# Patient Record
Sex: FEMALE | Race: WHITE | Employment: FULL TIME
[De-identification: names, ages, dates, MRNs, and addresses within clinical notes are randomized per-mention and may not be internally consistent; named-entity substitution may affect disease eponyms.]

---

## 2017-01-06 ENCOUNTER — OFFICE VISIT (OUTPATIENT)
Dept: FAMILY MEDICINE CLINIC | Facility: CLINIC | Age: 42
End: 2017-01-06

## 2017-01-06 VITALS
DIASTOLIC BLOOD PRESSURE: 100 MMHG | WEIGHT: 220 LBS | HEART RATE: 72 BPM | SYSTOLIC BLOOD PRESSURE: 130 MMHG | BODY MASS INDEX: 33.95 KG/M2 | TEMPERATURE: 98 F | RESPIRATION RATE: 20 BRPM

## 2017-01-06 DIAGNOSIS — F41.1 GAD (GENERALIZED ANXIETY DISORDER): Primary | ICD-10-CM

## 2017-01-06 DIAGNOSIS — F41.0 PANIC ATTACK: ICD-10-CM

## 2017-01-06 PROCEDURE — 99214 OFFICE O/P EST MOD 30 MIN: CPT | Performed by: NURSE PRACTITIONER

## 2017-01-06 RX ORDER — ALPRAZOLAM 0.25 MG/1
0.25 TABLET ORAL DAILY PRN
Qty: 10 TABLET | Refills: 0 | Status: SHIPPED | OUTPATIENT
Start: 2017-01-06 | End: 2017-02-05

## 2017-01-06 RX ORDER — CITALOPRAM 10 MG/1
10 TABLET ORAL DAILY
Qty: 30 TABLET | Refills: 3 | Status: SHIPPED | OUTPATIENT
Start: 2017-01-06 | End: 2017-05-18 | Stop reason: SDUPTHER

## 2017-01-06 ASSESSMENT — ENCOUNTER SYMPTOMS
COUGH: 0
SORE THROAT: 0
SINUS PRESSURE: 0
RHINORRHEA: 0
EYE REDNESS: 1
ABDOMINAL PAIN: 0
EYE ITCHING: 1
CHEST TIGHTNESS: 0
SHORTNESS OF BREATH: 0
CONSTIPATION: 1
WHEEZING: 0
NAUSEA: 0
EYE DISCHARGE: 1
DIARRHEA: 0

## 2017-05-18 DIAGNOSIS — F41.1 GAD (GENERALIZED ANXIETY DISORDER): ICD-10-CM

## 2017-05-18 DIAGNOSIS — F41.0 PANIC ATTACK: ICD-10-CM

## 2017-05-18 RX ORDER — CITALOPRAM 10 MG/1
10 TABLET ORAL DAILY
Qty: 30 TABLET | Refills: 0 | Status: SHIPPED | OUTPATIENT
Start: 2017-05-18 | End: 2017-06-29 | Stop reason: SDUPTHER

## 2017-06-29 ENCOUNTER — TELEPHONE (OUTPATIENT)
Dept: FAMILY MEDICINE CLINIC | Age: 42
End: 2017-06-29

## 2017-06-29 DIAGNOSIS — F41.0 PANIC ATTACK: ICD-10-CM

## 2017-06-29 DIAGNOSIS — F41.1 GAD (GENERALIZED ANXIETY DISORDER): ICD-10-CM

## 2017-06-29 RX ORDER — CITALOPRAM 10 MG/1
10 TABLET ORAL DAILY
Qty: 30 TABLET | Refills: 0 | Status: SHIPPED | OUTPATIENT
Start: 2017-06-29 | End: 2017-08-16 | Stop reason: SDUPTHER

## 2017-08-16 DIAGNOSIS — F41.1 GAD (GENERALIZED ANXIETY DISORDER): ICD-10-CM

## 2017-08-16 DIAGNOSIS — F41.0 PANIC ATTACK: ICD-10-CM

## 2017-08-17 ENCOUNTER — PATIENT MESSAGE (OUTPATIENT)
Dept: FAMILY MEDICINE CLINIC | Age: 42
End: 2017-08-17

## 2017-08-17 RX ORDER — CITALOPRAM 10 MG/1
10 TABLET ORAL DAILY
Qty: 30 TABLET | Refills: 1 | Status: SHIPPED | OUTPATIENT
Start: 2017-08-17 | End: 2017-11-13 | Stop reason: SDUPTHER

## 2017-12-21 DIAGNOSIS — F41.1 GAD (GENERALIZED ANXIETY DISORDER): ICD-10-CM

## 2017-12-21 DIAGNOSIS — F41.0 PANIC ATTACK: ICD-10-CM

## 2017-12-22 RX ORDER — CITALOPRAM 10 MG/1
10 TABLET ORAL DAILY
Qty: 30 TABLET | Refills: 0 | Status: SHIPPED | OUTPATIENT
Start: 2017-12-22 | End: 2018-02-14 | Stop reason: SDUPTHER

## 2017-12-22 NOTE — TELEPHONE ENCOUNTER
LOV: 01/06/17  RTO: 6 weeks  LR: 11/14/17    Health Maintenance   Topic Date Due    HIV screen  04/14/1992    DTaP/Tdap/Td vaccine (1 - Tdap) 04/14/1996    Flu vaccine (1) 09/01/2017    Cervical cancer screen  10/30/2017    Pneumococcal med risk (1 of 1 - PPSV23) 01/08/2018 (Originally 4/14/1996)    Lipid screen  02/21/2019             (applicable per patient's age: Cancer Screenings, Depression Screening, Fall Risk Screening, Immunizations)    LDL Cholesterol (mg/dL)   Date Value   02/21/2014 91     AST (U/L)   Date Value   02/21/2014 21     ALT (U/L)   Date Value   02/21/2014 10     BUN (mg/dL)   Date Value   02/21/2014 9      (goal A1C is < 7)   (goal LDL is <100) need 30-50% reduction from baseline     BP Readings from Last 3 Encounters:   01/06/17 (!) 130/100   11/18/16 102/70   08/11/14 110/62    (goal /80)      All Future Testing planned in [facility]:  Lab Frequency Next Occurrence   Lipid Panel Once 06/30/2017   Comprehensive Metabolic Panel Once 72/25/7731   CBC Once 06/30/2017       Next Visit Date:  Future Appointments  Date Time Provider Maryanne Addison   12/26/2017 4:40 PM [facility], PATT Chan            Patient Active Problem List:     Neurologic cardiac syncope     Anxiety     Tobacco abuse

## 2018-01-11 ENCOUNTER — TELEPHONE (OUTPATIENT)
Dept: FAMILY MEDICINE CLINIC | Age: 43
End: 2018-01-11

## 2018-01-12 ENCOUNTER — TELEPHONE (OUTPATIENT)
Dept: FAMILY MEDICINE CLINIC | Age: 43
End: 2018-01-12

## 2018-01-12 DIAGNOSIS — J01.90 ACUTE BACTERIAL SINUSITIS: Primary | ICD-10-CM

## 2018-01-12 DIAGNOSIS — B96.89 ACUTE BACTERIAL SINUSITIS: Primary | ICD-10-CM

## 2018-01-12 RX ORDER — DOXYCYCLINE HYCLATE 100 MG
100 TABLET ORAL 2 TIMES DAILY
Qty: 20 TABLET | Refills: 0 | Status: SHIPPED | OUTPATIENT
Start: 2018-01-12 | End: 2018-01-22

## 2018-02-14 ENCOUNTER — OFFICE VISIT (OUTPATIENT)
Dept: FAMILY MEDICINE CLINIC | Age: 43
End: 2018-02-14
Payer: COMMERCIAL

## 2018-02-14 VITALS
SYSTOLIC BLOOD PRESSURE: 100 MMHG | HEART RATE: 64 BPM | HEIGHT: 68 IN | WEIGHT: 235 LBS | DIASTOLIC BLOOD PRESSURE: 76 MMHG | TEMPERATURE: 98.5 F | BODY MASS INDEX: 35.61 KG/M2 | RESPIRATION RATE: 16 BRPM

## 2018-02-14 DIAGNOSIS — K59.09 CHRONIC CONSTIPATION: ICD-10-CM

## 2018-02-14 DIAGNOSIS — Z23 NEED FOR PROPHYLACTIC VACCINATION AGAINST STREPTOCOCCUS PNEUMONIAE (PNEUMOCOCCUS): ICD-10-CM

## 2018-02-14 DIAGNOSIS — F41.0 PANIC ATTACK: ICD-10-CM

## 2018-02-14 DIAGNOSIS — R53.82 CHRONIC FATIGUE: ICD-10-CM

## 2018-02-14 DIAGNOSIS — Z72.0 TOBACCO ABUSE: ICD-10-CM

## 2018-02-14 DIAGNOSIS — F41.1 GAD (GENERALIZED ANXIETY DISORDER): Primary | ICD-10-CM

## 2018-02-14 DIAGNOSIS — L65.9 HAIR LOSS: ICD-10-CM

## 2018-02-14 DIAGNOSIS — R63.8 UNABLE TO LOSE WEIGHT: ICD-10-CM

## 2018-02-14 DIAGNOSIS — Z11.4 ENCOUNTER FOR SCREENING FOR HIV: ICD-10-CM

## 2018-02-14 DIAGNOSIS — Z01.89 ROUTINE LAB DRAW: ICD-10-CM

## 2018-02-14 PROCEDURE — G8427 DOCREV CUR MEDS BY ELIG CLIN: HCPCS | Performed by: NURSE PRACTITIONER

## 2018-02-14 PROCEDURE — G8417 CALC BMI ABV UP PARAM F/U: HCPCS | Performed by: NURSE PRACTITIONER

## 2018-02-14 PROCEDURE — 90471 IMMUNIZATION ADMIN: CPT | Performed by: NURSE PRACTITIONER

## 2018-02-14 PROCEDURE — G8484 FLU IMMUNIZE NO ADMIN: HCPCS | Performed by: NURSE PRACTITIONER

## 2018-02-14 PROCEDURE — 4004F PT TOBACCO SCREEN RCVD TLK: CPT | Performed by: NURSE PRACTITIONER

## 2018-02-14 PROCEDURE — 99214 OFFICE O/P EST MOD 30 MIN: CPT | Performed by: NURSE PRACTITIONER

## 2018-02-14 PROCEDURE — 90732 PPSV23 VACC 2 YRS+ SUBQ/IM: CPT | Performed by: NURSE PRACTITIONER

## 2018-02-14 RX ORDER — ANTACID TABLETS 648 MG/1
1 TABLET, CHEWABLE ORAL DAILY
Qty: 30 TABLET | Refills: 0 | COMMUNITY
Start: 2018-02-14 | End: 2019-02-14

## 2018-02-14 RX ORDER — CITALOPRAM 10 MG/1
10 TABLET ORAL DAILY
Qty: 30 TABLET | Refills: 5 | Status: SHIPPED | OUTPATIENT
Start: 2018-02-14 | End: 2018-11-08 | Stop reason: SDUPTHER

## 2018-02-14 RX ORDER — CALCIUM CARBONATE/VITAMIN D3 500-10/5ML
2 LIQUID (ML) ORAL DAILY
Qty: 60 CAPSULE | Refills: 0 | COMMUNITY
Start: 2018-02-14 | End: 2019-02-14

## 2018-02-14 ASSESSMENT — ENCOUNTER SYMPTOMS
SHORTNESS OF BREATH: 0
DIARRHEA: 0
SORE THROAT: 0
EYE DISCHARGE: 1
RHINORRHEA: 0
SINUS PRESSURE: 0
COUGH: 0
ABDOMINAL PAIN: 0
EYE ITCHING: 0
CHEST TIGHTNESS: 0
CONSTIPATION: 1
EYE REDNESS: 0
WHEEZING: 0
NAUSEA: 0

## 2018-02-14 ASSESSMENT — PATIENT HEALTH QUESTIONNAIRE - PHQ9
SUM OF ALL RESPONSES TO PHQ QUESTIONS 1-9: 0
2. FEELING DOWN, DEPRESSED OR HOPELESS: 0
1. LITTLE INTEREST OR PLEASURE IN DOING THINGS: 0
SUM OF ALL RESPONSES TO PHQ9 QUESTIONS 1 & 2: 0

## 2018-02-14 NOTE — PATIENT INSTRUCTIONS
play a relaxation tape while you drive a car. When should you call for help? Call 911 anytime you think you may need emergency care. For example, call if:  ? · You feel you cannot stop from hurting yourself or someone else. ? Keep the numbers for these national suicide hotlines: 3-780-313-TALK (2-650.711.4320) and 1-879-ARWJNWL (2-348.551.9477). If you or someone you know talks about suicide or feeling hopeless, get help right away. ? Watch closely for changes in your health, and be sure to contact your doctor if:  ? · You have anxiety or fear that affects your life. ? · You have symptoms of anxiety that are new or different from those you had before. Where can you learn more? Go to https://Bin1 ATEpeSkyPhraseeb.[employer] Online [employer]. org and sign in to your [facility] account. Enter P754 in the [facility] box to learn more about \"Anxiety Disorder: Care Instructions. \"     If you do not have an account, please click on the \"Sign Up Now\" link. Current as of: May 12, 2017  Content Version: 11.5  © 8769-8560 [employer]. Care instructions adapted under license by [facility] ([facility]). If you have questions about a medical condition or this instruction, always ask your healthcare professional. [address] any warranty or liability for your use of this information. Patient Education        Fatigue: Care Instructions  Your Care Instructions    Fatigue is a feeling of tiredness, exhaustion, or lack of energy. You may feel fatigue because of too much or not enough activity. It can also come from stress, lack of sleep, boredom, and poor diet. Many medical problems, such as viral infections, can cause fatigue. Emotional problems, especially depression, are often the cause of fatigue. Fatigue is most often a symptom of another problem. Treatment for fatigue depends on the cause.  For example, if you have fatigue because you have a certain health problem, treating this problem also treats small children, living smoke-free means they're less likely to have ear infections, pneumonia, and bronchitis. · If you're a woman who is or will be pregnant someday, quitting smoking means a healthier . · Children who are close to you are less likely to become adult smokers. Where can you learn more? Go to https://chpeluisito.healthJamStar. org and sign in to your [employer] account. Enter [id number] [id number] in the [employer] box to learn more about \"Learning About Benefits From Quitting Smoking. \"     If you do not have an account, please click on the \"Sign Up Now\" link. Current as of: 2017  Content Version: 11.5  © 0828-5175 Healthwise, Incorporated. Care instructions adapted under license by [facility] ([facility]). If you have questions about a medical condition or this instruction, always ask your healthcare professional. [address] any warranty or liability for your use of this information.

## 2018-02-14 NOTE — PROGRESS NOTES
chest tightness, shortness of breath and wheezing. Cardiovascular: Negative for chest pain and palpitations. Gastrointestinal: Positive for constipation (chronic). Negative for abdominal pain, diarrhea and nausea. Genitourinary: Negative. No menses d/t mirena   Skin: Negative for rash. Allergic/Immunologic: Positive for environmental allergies. Neurological: Negative for dizziness, syncope and headaches. Psychiatric/Behavioral: Negative for self-injury, sleep disturbance and suicidal ideas. The patient is nervous/anxious. Mood controlled on current medications       Objective:   Physical Exam   Constitutional: She is oriented to person, place, and time. Vital signs are normal. She appears well-developed. She is cooperative. No distress. Obese   HENT:   Head: Normocephalic and atraumatic. Right Ear: Tympanic membrane, external ear and ear canal normal. Right ear middle ear effusion: mild. Left Ear: Tympanic membrane, external ear and ear canal normal. Left ear middle ear effusion: mild. Nose: Mucosal edema and rhinorrhea present. Right sinus exhibits no maxillary sinus tenderness and no frontal sinus tenderness. Left sinus exhibits no maxillary sinus tenderness and no frontal sinus tenderness. Mouth/Throat: Uvula is midline and mucous membranes are normal. Posterior oropharyngeal erythema (cobblestoning) present. Eyes: Conjunctivae and lids are normal. Pupils are equal, round, and reactive to light. Right eye exhibits no discharge. Left eye exhibits no discharge. Neck: Trachea normal and normal range of motion. Neck supple. Carotid bruit is not present. Cardiovascular: Normal rate, regular rhythm and normal heart sounds. Exam reveals no distant heart sounds. No murmur heard. Pulses:       Radial pulses are 2+ on the right side, and 2+ on the left side. Pulmonary/Chest: Effort normal and breath sounds normal. No respiratory distress.  She has no decreased breath sounds. She has no wheezes. She has no rhonchi. She exhibits no tenderness. Abdominal: Soft. Normal appearance and bowel sounds are normal. There is no tenderness. There is no rebound. Musculoskeletal:   No red or swollen joints. Lymphadenopathy:     She has no cervical adenopathy. Neurological: She is alert and oriented to person, place, and time. She has normal strength. She exhibits normal muscle tone. Skin: Skin is warm and dry. No rash noted. Psychiatric: Her speech is normal and behavior is normal. Judgment and thought content normal. Her mood appears anxious. Cognition and memory are normal. She expresses no homicidal and no suicidal ideation. Nursing note and vitals reviewed. Assessment:      1. TWILA (generalized anxiety disorder)  citalopram (CELEXA) 10 MG tablet   2. Panic attack  citalopram (CELEXA) 10 MG tablet   3. Hair loss  TSH without Reflex    T3, Free    T4, Free   4. Chronic fatigue  CBC    TSH without Reflex    Vitamin B12    T3, Free    T4, Free   5. Chronic constipation  Magnesium Oxide 400 MG CAPS   6. Tobacco abuse     7. Unable to lose weight  Hemoglobin A1C    TSH without Reflex    Insulin, total   8. Routine lab draw  Lipid Panel    Comprehensive Metabolic Panel    CBC   9. Need for prophylactic vaccination against Streptococcus pneumoniae (pneumococcus)  Pneumococcal polysaccharide vaccine 23-valent PPSV23   10. Encounter for screening for HIV  HIV Screen         Plan:       Obtain Pap from Dr Saul Overcast labs as ordered  Proceed with pneumovax as ordered   Smoking cessation encouraged - she is working on this  Recommend continue current medications, healthy diet, and regular exercise. Monitor for worsening symptoms   Call with concerns  Return in about 6 months (around 8/14/2018) for anxiety.       Keo Morelos received counseling on the following healthy behaviors: nutrition, exercise, medication adherence and tobacco cessation  Reviewed prior labs and health

## 2018-02-16 ENCOUNTER — HOSPITAL ENCOUNTER (OUTPATIENT)
Age: 43
Setting detail: SPECIMEN
Discharge: HOME | End: 2018-02-16
Payer: COMMERCIAL

## 2018-02-16 DIAGNOSIS — Z01.89 ROUTINE LAB DRAW: ICD-10-CM

## 2018-02-16 DIAGNOSIS — R53.82 CHRONIC FATIGUE: ICD-10-CM

## 2018-02-16 DIAGNOSIS — R63.8 UNABLE TO LOSE WEIGHT: ICD-10-CM

## 2018-02-16 DIAGNOSIS — L65.9 HAIR LOSS: ICD-10-CM

## 2018-02-16 DIAGNOSIS — Z11.4 ENCOUNTER FOR SCREENING FOR HIV: ICD-10-CM

## 2018-02-16 LAB
ALBUMIN SERPL-MCNC: 4.2 G/DL (ref 3.5–5.2)
ALBUMIN/GLOBULIN RATIO: 1.3 (ref 1–2.5)
ALP BLD-CCNC: 39 U/L (ref 35–104)
ALT SERPL-CCNC: 10 U/L (ref 5–33)
ANION GAP SERPL CALCULATED.3IONS-SCNC: 14 MMOL/L (ref 9–17)
AST SERPL-CCNC: 17 U/L
BILIRUB SERPL-MCNC: 0.66 MG/DL (ref 0.3–1.2)
BUN BLDV-MCNC: 13 MG/DL (ref 6–20)
BUN/CREAT BLD: NORMAL (ref 9–20)
CALCIUM SERPL-MCNC: 9 MG/DL (ref 8.6–10.4)
CHLORIDE BLD-SCNC: 101 MMOL/L (ref 98–107)
CHOLESTEROL/HDL RATIO: 2.9
CHOLESTEROL: 196 MG/DL
CO2: 24 MMOL/L (ref 20–31)
CREAT SERPL-MCNC: 0.59 MG/DL (ref 0.5–0.9)
ESTIMATED AVERAGE GLUCOSE: 94 MG/DL
GFR AFRICAN AMERICAN: >60 ML/MIN
GFR NON-AFRICAN AMERICAN: >60 ML/MIN
GFR SERPL CREATININE-BSD FRML MDRD: NORMAL ML/MIN/{1.73_M2}
GFR SERPL CREATININE-BSD FRML MDRD: NORMAL ML/MIN/{1.73_M2}
GLUCOSE BLD-MCNC: 80 MG/DL (ref 70–99)
HBA1C MFR BLD: 4.9 % (ref 4–6)
HCT VFR BLD CALC: 42.1 % (ref 36.3–47.1)
HDLC SERPL-MCNC: 67 MG/DL
HEMOGLOBIN: 13.2 G/DL (ref 11.9–15.1)
HIV AG/AB: NONREACTIVE
INSULIN COMMENT: NORMAL
INSULIN REFERENCE RANGE:: NORMAL
INSULIN: 5.1 MU/L
LDL CHOLESTEROL: 120 MG/DL (ref 0–130)
MCH RBC QN AUTO: 30.2 PG (ref 25.2–33.5)
MCHC RBC AUTO-ENTMCNC: 31.4 G/DL (ref 28.4–34.8)
MCV RBC AUTO: 96.3 FL (ref 82.6–102.9)
NRBC AUTOMATED: 0 PER 100 WBC
PDW BLD-RTO: 12.2 % (ref 11.8–14.4)
PLATELET # BLD: 318 K/UL (ref 138–453)
PMV BLD AUTO: 10.7 FL (ref 8.1–13.5)
POTASSIUM SERPL-SCNC: 4.1 MMOL/L (ref 3.7–5.3)
RBC # BLD: 4.37 M/UL (ref 3.95–5.11)
SODIUM BLD-SCNC: 139 MMOL/L (ref 135–144)
T3 FREE: 2.45 PG/ML (ref 2.02–4.43)
THYROXINE, FREE: 1.05 NG/DL (ref 0.93–1.7)
TOTAL PROTEIN: 7.5 G/DL (ref 6.4–8.3)
TRIGL SERPL-MCNC: 47 MG/DL
TSH SERPL DL<=0.05 MIU/L-ACNC: 1.16 MIU/L (ref 0.3–5)
VITAMIN B-12: 999 PG/ML (ref 232–1245)
VLDLC SERPL CALC-MCNC: NORMAL MG/DL (ref 1–30)
WBC # BLD: 6.7 K/UL (ref 3.5–11.3)

## 2018-03-12 PROBLEM — Z41.1 ELECTIVE PROCEDURE FOR UNACCEPTABLE COSMETIC APPEARANCE: Status: ACTIVE | Noted: 2018-03-12

## 2018-03-23 ENCOUNTER — HOSPITAL ENCOUNTER (OUTPATIENT)
Age: 43
Setting detail: SPECIMEN
Discharge: HOME | End: 2018-03-23
Payer: COMMERCIAL

## 2018-03-23 LAB
-: NORMAL
AMORPHOUS: NORMAL
BACTERIA: NORMAL
BILIRUBIN URINE: NEGATIVE
CASTS UA: NORMAL /LPF (ref 0–8)
COLOR: YELLOW
COMMENT UA: ABNORMAL
CRYSTALS, UA: NORMAL /HPF
EPITHELIAL CELLS UA: NORMAL /HPF (ref 0–5)
GLUCOSE URINE: NEGATIVE
KETONES, URINE: NEGATIVE
LEUKOCYTE ESTERASE, URINE: NEGATIVE
MUCUS: NORMAL
NITRITE, URINE: NEGATIVE
OTHER OBSERVATIONS UA: NORMAL
PH UA: 6.5 (ref 5–8)
PROTEIN UA: NEGATIVE
RBC UA: NORMAL /HPF (ref 0–4)
RENAL EPITHELIAL, UA: NORMAL /HPF
SPECIFIC GRAVITY UA: 1.01 (ref 1–1.03)
TRICHOMONAS: NORMAL
TURBIDITY: ABNORMAL
URINE HGB: NEGATIVE
UROBILINOGEN, URINE: NORMAL
WBC UA: NORMAL /HPF (ref 0–5)
YEAST: NORMAL

## 2018-03-29 ENCOUNTER — HOSPITAL ENCOUNTER (OUTPATIENT)
Facility: CLINIC | Age: 43
Setting detail: OUTPATIENT SURGERY
Discharge: HOME | End: 2018-03-29
Attending: PLASTIC SURGERY | Admitting: PLASTIC SURGERY

## 2018-03-29 ENCOUNTER — ANESTHESIA (OUTPATIENT)
Dept: OPERATING ROOM | Facility: CLINIC | Age: 43
End: 2018-03-29

## 2018-03-29 ENCOUNTER — ANESTHESIA EVENT (OUTPATIENT)
Dept: OPERATING ROOM | Facility: CLINIC | Age: 43
End: 2018-03-29

## 2018-03-29 VITALS
HEART RATE: 60 BPM | RESPIRATION RATE: 10 BRPM | WEIGHT: 225 LBS | DIASTOLIC BLOOD PRESSURE: 67 MMHG | BODY MASS INDEX: 34.1 KG/M2 | HEIGHT: 68 IN | OXYGEN SATURATION: 97 % | TEMPERATURE: 96.8 F | SYSTOLIC BLOOD PRESSURE: 118 MMHG

## 2018-03-29 VITALS
TEMPERATURE: 94.8 F | OXYGEN SATURATION: 100 % | DIASTOLIC BLOOD PRESSURE: 70 MMHG | SYSTOLIC BLOOD PRESSURE: 128 MMHG | RESPIRATION RATE: 11 BRPM

## 2018-03-29 DIAGNOSIS — Z41.1 ELECTIVE PROCEDURE FOR UNACCEPTABLE COSMETIC APPEARANCE: Primary | ICD-10-CM

## 2018-03-29 PROCEDURE — 7100000000 HC PACU RECOVERY - FIRST 15 MIN: Performed by: PLASTIC SURGERY

## 2018-03-29 PROCEDURE — 6360000002 HC RX W HCPCS: Performed by: PLASTIC SURGERY

## 2018-03-29 PROCEDURE — 3600000004 HC SURGERY LEVEL 4 BASE: Performed by: PLASTIC SURGERY

## 2018-03-29 PROCEDURE — 6360000002 HC RX W HCPCS: Performed by: ANESTHESIOLOGY

## 2018-03-29 PROCEDURE — 2500000003 HC RX 250 WO HCPCS: Performed by: PLASTIC SURGERY

## 2018-03-29 PROCEDURE — 7100000010 HC PHASE II RECOVERY - FIRST 15 MIN: Performed by: PLASTIC SURGERY

## 2018-03-29 PROCEDURE — 6370000000 HC RX 637 (ALT 250 FOR IP): Performed by: PLASTIC SURGERY

## 2018-03-29 PROCEDURE — 2580000003 HC RX 258: Performed by: ANESTHESIOLOGY

## 2018-03-29 PROCEDURE — 3700000001 HC ADD 15 MINUTES (ANESTHESIA): Performed by: PLASTIC SURGERY

## 2018-03-29 PROCEDURE — 7100000001 HC PACU RECOVERY - ADDTL 15 MIN: Performed by: PLASTIC SURGERY

## 2018-03-29 PROCEDURE — 2580000003 HC RX 258: Performed by: PLASTIC SURGERY

## 2018-03-29 PROCEDURE — 84703 CHORIONIC GONADOTROPIN ASSAY: CPT

## 2018-03-29 PROCEDURE — 7100000011 HC PHASE II RECOVERY - ADDTL 15 MIN: Performed by: PLASTIC SURGERY

## 2018-03-29 PROCEDURE — 2500000003 HC RX 250 WO HCPCS: Performed by: ANESTHESIOLOGY

## 2018-03-29 PROCEDURE — 3600000014 HC SURGERY LEVEL 4 ADDTL 15MIN: Performed by: PLASTIC SURGERY

## 2018-03-29 PROCEDURE — 3700000000 HC ANESTHESIA ATTENDED CARE: Performed by: PLASTIC SURGERY

## 2018-03-29 DEVICE — IMPLANTABLE DEVICE: Type: IMPLANTABLE DEVICE | Site: BREAST | Status: FUNCTIONAL

## 2018-03-29 RX ORDER — BUPIVACAINE HYDROCHLORIDE 5 MG/ML
INJECTION, SOLUTION EPIDURAL; INTRACAUDAL PRN
Status: DISCONTINUED | OUTPATIENT
Start: 2018-03-29 | End: 2018-03-29 | Stop reason: HOSPADM

## 2018-03-29 RX ORDER — OXYCODONE HYDROCHLORIDE AND ACETAMINOPHEN 5; 325 MG/1; MG/1
1 TABLET ORAL ONCE
Status: COMPLETED | OUTPATIENT
Start: 2018-03-29 | End: 2018-03-29

## 2018-03-29 RX ORDER — CEFAZOLIN SODIUM 1 G/3ML
2 INJECTION, POWDER, FOR SOLUTION INTRAMUSCULAR; INTRAVENOUS ONCE
Status: COMPLETED | OUTPATIENT
Start: 2018-03-29 | End: 2018-03-29

## 2018-03-29 RX ORDER — PROPOFOL 10 MG/ML
INJECTION, EMULSION INTRAVENOUS PRN
Status: DISCONTINUED | OUTPATIENT
Start: 2018-03-29 | End: 2018-03-29 | Stop reason: SDUPTHER

## 2018-03-29 RX ORDER — FENTANYL CITRATE 50 UG/ML
50 INJECTION, SOLUTION INTRAMUSCULAR; INTRAVENOUS EVERY 5 MIN PRN
Status: DISCONTINUED | OUTPATIENT
Start: 2018-03-29 | End: 2018-03-29 | Stop reason: HOSPADM

## 2018-03-29 RX ORDER — SODIUM CHLORIDE, SODIUM LACTATE, POTASSIUM CHLORIDE, CALCIUM CHLORIDE 600; 310; 30; 20 MG/100ML; MG/100ML; MG/100ML; MG/100ML
INJECTION, SOLUTION INTRAVENOUS CONTINUOUS
Status: DISCONTINUED | OUTPATIENT
Start: 2018-03-29 | End: 2018-03-29 | Stop reason: HOSPADM

## 2018-03-29 RX ORDER — FENTANYL CITRATE 50 UG/ML
INJECTION, SOLUTION INTRAMUSCULAR; INTRAVENOUS PRN
Status: DISCONTINUED | OUTPATIENT
Start: 2018-03-29 | End: 2018-03-29 | Stop reason: SDUPTHER

## 2018-03-29 RX ORDER — MEPERIDINE HYDROCHLORIDE 50 MG/ML
12.5 INJECTION INTRAMUSCULAR; INTRAVENOUS; SUBCUTANEOUS EVERY 5 MIN PRN
Status: DISCONTINUED | OUTPATIENT
Start: 2018-03-29 | End: 2018-03-29 | Stop reason: HOSPADM

## 2018-03-29 RX ORDER — FENTANYL CITRATE 50 UG/ML
25 INJECTION, SOLUTION INTRAMUSCULAR; INTRAVENOUS EVERY 5 MIN PRN
Status: DISCONTINUED | OUTPATIENT
Start: 2018-03-29 | End: 2018-03-29 | Stop reason: HOSPADM

## 2018-03-29 RX ORDER — CEFAZOLIN SODIUM 1 G/3ML
1 INJECTION, POWDER, FOR SOLUTION INTRAMUSCULAR; INTRAVENOUS ONCE
Status: COMPLETED | OUTPATIENT
Start: 2018-03-29 | End: 2018-03-29

## 2018-03-29 RX ORDER — ROCURONIUM BROMIDE 10 MG/ML
INJECTION, SOLUTION INTRAVENOUS PRN
Status: DISCONTINUED | OUTPATIENT
Start: 2018-03-29 | End: 2018-03-29 | Stop reason: SDUPTHER

## 2018-03-29 RX ORDER — MIDAZOLAM HYDROCHLORIDE 1 MG/ML
2 INJECTION INTRAMUSCULAR; INTRAVENOUS ONCE
Status: COMPLETED | OUTPATIENT
Start: 2018-03-29 | End: 2018-03-29

## 2018-03-29 RX ORDER — CLINDAMYCIN HYDROCHLORIDE 300 MG/1
300 CAPSULE ORAL 3 TIMES DAILY
Qty: 30 CAPSULE | Refills: 0 | Status: SHIPPED | OUTPATIENT
Start: 2018-03-29 | End: 2018-04-08

## 2018-03-29 RX ORDER — LIDOCAINE HYDROCHLORIDE 10 MG/ML
INJECTION, SOLUTION INFILTRATION; PERINEURAL PRN
Status: DISCONTINUED | OUTPATIENT
Start: 2018-03-29 | End: 2018-03-29 | Stop reason: SDUPTHER

## 2018-03-29 RX ORDER — ONDANSETRON 2 MG/ML
4 INJECTION INTRAMUSCULAR; INTRAVENOUS
Status: COMPLETED | OUTPATIENT
Start: 2018-03-29 | End: 2018-03-29

## 2018-03-29 RX ORDER — GLYCOPYRROLATE 0.2 MG/ML
INJECTION INTRAMUSCULAR; INTRAVENOUS PRN
Status: DISCONTINUED | OUTPATIENT
Start: 2018-03-29 | End: 2018-03-29 | Stop reason: SDUPTHER

## 2018-03-29 RX ORDER — NEOSTIGMINE METHYLSULFATE 1 MG/ML
INJECTION, SOLUTION INTRAVENOUS PRN
Status: DISCONTINUED | OUTPATIENT
Start: 2018-03-29 | End: 2018-03-29 | Stop reason: SDUPTHER

## 2018-03-29 RX ORDER — SCOLOPAMINE TRANSDERMAL SYSTEM 1 MG/1
1 PATCH, EXTENDED RELEASE TRANSDERMAL
Status: DISCONTINUED | OUTPATIENT
Start: 2018-03-29 | End: 2018-03-29 | Stop reason: HOSPADM

## 2018-03-29 RX ORDER — OXYCODONE HYDROCHLORIDE AND ACETAMINOPHEN 5; 325 MG/1; MG/1
1 TABLET ORAL EVERY 4 HOURS PRN
Qty: 40 TABLET | Refills: 0 | Status: SHIPPED | OUTPATIENT
Start: 2018-03-29 | End: 2018-04-08

## 2018-03-29 RX ADMIN — FENTANYL CITRATE 50 MCG: 50 INJECTION INTRAMUSCULAR; INTRAVENOUS at 09:23

## 2018-03-29 RX ADMIN — MIDAZOLAM HYDROCHLORIDE 2 MG: 1 INJECTION, SOLUTION INTRAMUSCULAR; INTRAVENOUS at 08:59

## 2018-03-29 RX ADMIN — LIDOCAINE HYDROCHLORIDE 50 MG: 10 INJECTION, SOLUTION EPIDURAL; INFILTRATION; INTRACAUDAL; PERINEURAL at 09:08

## 2018-03-29 RX ADMIN — ONDANSETRON 4 MG: 2 INJECTION INTRAMUSCULAR; INTRAVENOUS at 10:26

## 2018-03-29 RX ADMIN — CEFAZOLIN 2 G: 1 INJECTION, POWDER, FOR SOLUTION INTRAMUSCULAR; INTRAVENOUS at 09:19

## 2018-03-29 RX ADMIN — FENTANYL CITRATE 50 MCG: 50 INJECTION INTRAMUSCULAR; INTRAVENOUS at 09:09

## 2018-03-29 RX ADMIN — ROCURONIUM BROMIDE 50 MG: 10 INJECTION INTRAVENOUS at 09:09

## 2018-03-29 RX ADMIN — CEFAZOLIN 1 G: 330 INJECTION, POWDER, FOR SOLUTION INTRAMUSCULAR; INTRAVENOUS at 10:55

## 2018-03-29 RX ADMIN — SODIUM CHLORIDE, POTASSIUM CHLORIDE, SODIUM LACTATE AND CALCIUM CHLORIDE: 600; 310; 30; 20 INJECTION, SOLUTION INTRAVENOUS at 08:59

## 2018-03-29 RX ADMIN — PROPOFOL 200 MG: 10 INJECTION, EMULSION INTRAVENOUS at 09:08

## 2018-03-29 RX ADMIN — NEOSTIGMINE METHYLSULFATE 2 MG: 1 INJECTION, SOLUTION INTRAVENOUS at 10:32

## 2018-03-29 RX ADMIN — GLYCOPYRROLATE 0.4 MG: 0.2 INJECTION INTRAMUSCULAR; INTRAVENOUS at 10:32

## 2018-03-29 RX ADMIN — OXYCODONE HYDROCHLORIDE AND ACETAMINOPHEN 1 TABLET: 5; 325 TABLET ORAL at 11:25

## 2018-03-29 ASSESSMENT — PULMONARY FUNCTION TESTS
PIF_VALUE: 20
PIF_VALUE: 19
PIF_VALUE: 16
PIF_VALUE: 18
PIF_VALUE: 19
PIF_VALUE: 18
PIF_VALUE: 5
PIF_VALUE: 19
PIF_VALUE: 19
PIF_VALUE: 20
PIF_VALUE: 18
PIF_VALUE: 16
PIF_VALUE: 21
PIF_VALUE: 19
PIF_VALUE: 19
PIF_VALUE: 15
PIF_VALUE: 0
PIF_VALUE: 0
PIF_VALUE: 19
PIF_VALUE: 19
PIF_VALUE: 0
PIF_VALUE: 18
PIF_VALUE: 5
PIF_VALUE: 16
PIF_VALUE: 20
PIF_VALUE: 19
PIF_VALUE: 21
PIF_VALUE: 4
PIF_VALUE: 19
PIF_VALUE: 19
PIF_VALUE: 18
PIF_VALUE: 19
PIF_VALUE: 17
PIF_VALUE: 18
PIF_VALUE: 17
PIF_VALUE: 20
PIF_VALUE: 18
PIF_VALUE: 19
PIF_VALUE: 20
PIF_VALUE: 19
PIF_VALUE: 19
PIF_VALUE: 23
PIF_VALUE: 17
PIF_VALUE: 18
PIF_VALUE: 26
PIF_VALUE: 19
PIF_VALUE: 1
PIF_VALUE: 19
PIF_VALUE: 20
PIF_VALUE: 19
PIF_VALUE: 18
PIF_VALUE: 19
PIF_VALUE: 18
PIF_VALUE: 21
PIF_VALUE: 20
PIF_VALUE: 17
PIF_VALUE: 1
PIF_VALUE: 16
PIF_VALUE: 18
PIF_VALUE: 16
PIF_VALUE: 18
PIF_VALUE: 19
PIF_VALUE: 19
PIF_VALUE: 20
PIF_VALUE: 16
PIF_VALUE: 16
PIF_VALUE: 18
PIF_VALUE: 19
PIF_VALUE: 17
PIF_VALUE: 15
PIF_VALUE: 19
PIF_VALUE: 20
PIF_VALUE: 17
PIF_VALUE: 15
PIF_VALUE: 19
PIF_VALUE: 8
PIF_VALUE: 42
PIF_VALUE: 4
PIF_VALUE: 19
PIF_VALUE: 20
PIF_VALUE: 19
PIF_VALUE: 20
PIF_VALUE: 0
PIF_VALUE: 19
PIF_VALUE: 20
PIF_VALUE: 20
PIF_VALUE: 19
PIF_VALUE: 19
PIF_VALUE: 16
PIF_VALUE: 15
PIF_VALUE: 19
PIF_VALUE: 15
PIF_VALUE: 0
PIF_VALUE: 19
PIF_VALUE: 18
PIF_VALUE: 19

## 2018-03-29 ASSESSMENT — PAIN - FUNCTIONAL ASSESSMENT: PAIN_FUNCTIONAL_ASSESSMENT: 0-10

## 2018-03-29 ASSESSMENT — PAIN SCALES - GENERAL
PAINLEVEL_OUTOF10: 6
PAINLEVEL_OUTOF10: 7
PAINLEVEL_OUTOF10: 5

## 2018-03-29 NOTE — H&P
Spouse name: N/A    Number of children: N/A    Years of education: N/A          Occupational History    Not on file.             Social History Main Topics    Smoking status: Former Smoker       Packs/day: 0.25       Years: 24.00       Types: Cigarettes       Quit date: 2/6/2018    Smokeless tobacco: Never Used         Comment: / nicorette gum currently    Alcohol use Yes         Comment: social drinker    Drug use: No    Sexual activity: Yes       Partners: Male           Other Topics Concern    Not on file          Social History Narrative    No narrative on file            ROS IS OTHERWISE NEGATIVE     Objective:   Physical Exam   Constitutional: She is oriented to person, place, and time. Vital signs are normal. She appears well-developed and well-nourished. HENT:   Head: Normocephalic and atraumatic. Eyes: Conjunctivae and EOM are normal. Pupils are equal, round, and reactive to light. Neck: Normal range of motion. Neck supple. Cardiovascular: Normal rate. Pulmonary/Chest: Effort normal. No respiratory distress. She has no wheezes. Musculoskeletal: Normal range of motion. She exhibits no edema or tenderness. Neurological: She is alert and oriented to person, place, and time. Skin: Skin is warm and dry. No rash noted. No erythema. Psychiatric: She has a normal mood and affect. Her behavior is normal. Thought content normal.   Nursing note and vitals reviewed. Ht= 5 7.5  Wt= 225 lb  Bra Size 36 B  Measurements  Left= 23.5/23  Right=24  Folds= Left Fold Higher than the Right Fold  Symmetry= Very Symmetrical  Chest wall= Average/ wide Mykel chest width   Implant Placement=   Sub glandular     Assessment:     Bilateral micromastia  Plan:   1. Bilateral Breat Augmentation with Subglandular implant placement                Sized at 550 +/- 25 cc  Patient was seen in the room with my nurse at all times.   Portions of this note were dictated on the [employer] system and

## 2018-03-29 NOTE — BRIEF OP NOTE
Brief Postoperative Note  ______________________________________________________________    Patient: Andria Mejia  YOB: 1975  MRN: [id number]  Date of Procedure: 3/29/2018    Pre-Op Diagnosis: COSMETIC     Post-Op Diagnosis: Same       Procedure(s):  COSMETIC - BREAST MAMMOPLASTY AUGMENTATION    Anesthesia: General    Surgeon(s):  Charls Essex, MD Weston Eng, DO PGY-1  Marbella Bergman DO PGY-1  Staff:  Scrub Person First: An Man RN     Estimated Blood Loss: <04 mL    Complications: None    Specimens:   * No specimens in log *    Implants:    Implant Name Type Inv.  Item Serial No.  Lot No. LRB No. Used    CC BREAST IMPLANT SMOOTH ROUND FULL PROFILE    [id number]  [id number] Right 1    CC BREAST IMPLANT SMOOTH ROUND FULL PROFILE     [id number]   [id number] Left 1         Drains:  None    Findings: Breast Augmentation     Leonard Stern DO  Date: 3/29/2018  Time: 10:38 AM

## 2018-03-30 LAB — HCG, PREGNANCY URINE (POC): NEGATIVE

## 2018-03-30 NOTE — OP NOTE
[address] [facility]                                 OPERATIVE REPORT    PATIENT NAME: Conor Pacheco                      :        1975  MED REC NO:   [id number]                             ROOM:  ACCOUNT NO:   [de-identified]                           ADMIT DATE: 2018  PROVIDER:     Ana Corona    DATE OF PROCEDURE:  2018    PREOPERATIVE DIAGNOSIS:  Bilateral micromastia. POSTOPERATIVE DIAGNOSIS:  Bilateral micromastia. PROCEDURE:  Bilateral augmentation mammoplasty. SURGEON:  Ana Corona MD    ANESTHESIA:  General.    INDICATION:  This is a 79-year-old, requesting bilateral cosmetic breast  augmentation. She was evaluated preoperatively and felt to be  approximately a 500- to 560-mL implant and had enough tissue for a  subglandular approach. FINDINGS:  ameena Casper  mL, smooth-walled, full-profile  breast implants were placed bilaterally in the subglandular position  through a periareolar approach. DESCRIPTION OF PROCEDURE:  With the patient in the sitting position,  bilateral inframammary folds were marked, and photographs were taken. The  patient was taken to the operating room. General anesthesia was induced  via oral endotracheal intubation. After appropriate prep and drape, the  breasts was injected with 0.25% Xylocaine, 1:400,000 epinephrine local.   Bilateral lower inner quadrant periareolar incisions were designed in size,  and dissection was carried directly posteriorly to the pectoralis fascia. Subglandular pockets were elevated as designed with the lighted retractor. Sizers were checked, and a 560-mL implant was chosen. This was placed  using the Castillo funnel bilaterally after the wounds were checked for  hemostasis and found to be hemostatic.   Multilayer closure using 3-0  Monocryl was performed with excellent symmetry noted and excellent

## 2018-11-08 DIAGNOSIS — F41.0 PANIC ATTACK: ICD-10-CM

## 2018-11-08 DIAGNOSIS — F41.1 GAD (GENERALIZED ANXIETY DISORDER): ICD-10-CM

## 2018-11-09 RX ORDER — CITALOPRAM 10 MG/1
10 TABLET ORAL DAILY
Qty: 30 TABLET | Refills: 1 | Status: SHIPPED | OUTPATIENT
Start: 2018-11-09 | End: 2019-01-04 | Stop reason: SDUPTHER

## 2018-11-19 ENCOUNTER — HOSPITAL ENCOUNTER (OUTPATIENT)
Age: 43
Setting detail: SPECIMEN
Discharge: HOME | End: 2018-11-19
Payer: COMMERCIAL

## 2018-11-19 LAB
ESTRADIOL LEVEL: 49 PG/ML (ref 27–314)
FOLLICLE STIMULATING HORMONE: 13.1 U/L (ref 1.7–21.5)
GLUCOSE ADMINISTRATION: NORMAL
GLUCOSE TOLERANCE SCREEN 50G: 72 MG/DL (ref 70–135)
LH: 7.3 U/L (ref 1–95.6)
PROLACTIN: 19.53 UG/L (ref 4.79–23.3)
T3 FREE: 2.83 PG/ML (ref 2.02–4.43)
TESTOSTERONE TOTAL: 6 NG/DL (ref 20–70)
THYROXINE, FREE: 1.17 NG/DL (ref 0.93–1.7)
TSH SERPL DL<=0.05 MIU/L-ACNC: 1.82 MIU/L (ref 0.3–5)

## 2018-12-07 ENCOUNTER — OFFICE VISIT (OUTPATIENT)
Dept: FAMILY MEDICINE CLINIC | Age: 43
End: 2018-12-07
Payer: COMMERCIAL

## 2018-12-07 VITALS
DIASTOLIC BLOOD PRESSURE: 72 MMHG | RESPIRATION RATE: 14 BRPM | HEART RATE: 68 BPM | BODY MASS INDEX: 37.28 KG/M2 | WEIGHT: 238 LBS | SYSTOLIC BLOOD PRESSURE: 110 MMHG | TEMPERATURE: 98.7 F

## 2018-12-07 DIAGNOSIS — R63.5 WEIGHT GAIN, ABNORMAL: ICD-10-CM

## 2018-12-07 DIAGNOSIS — F41.9 ANXIETY: Primary | ICD-10-CM

## 2018-12-07 PROCEDURE — G8427 DOCREV CUR MEDS BY ELIG CLIN: HCPCS | Performed by: NURSE PRACTITIONER

## 2018-12-07 PROCEDURE — 1036F TOBACCO NON-USER: CPT | Performed by: NURSE PRACTITIONER

## 2018-12-07 PROCEDURE — G8484 FLU IMMUNIZE NO ADMIN: HCPCS | Performed by: NURSE PRACTITIONER

## 2018-12-07 PROCEDURE — 99214 OFFICE O/P EST MOD 30 MIN: CPT | Performed by: NURSE PRACTITIONER

## 2018-12-07 PROCEDURE — G8417 CALC BMI ABV UP PARAM F/U: HCPCS | Performed by: NURSE PRACTITIONER

## 2018-12-07 RX ORDER — PHENTERMINE HYDROCHLORIDE 37.5 MG/1
37.5 TABLET ORAL
Qty: 30 TABLET | Refills: 0 | Status: SHIPPED | OUTPATIENT
Start: 2018-12-07 | End: 2019-01-04 | Stop reason: SDUPTHER

## 2018-12-07 ASSESSMENT — ENCOUNTER SYMPTOMS
EYE DISCHARGE: 0
DIARRHEA: 0
ABDOMINAL DISTENTION: 0
RHINORRHEA: 0
SHORTNESS OF BREATH: 0
VOMITING: 0
BLOOD IN STOOL: 0
SORE THROAT: 0
EYE PAIN: 0
WHEEZING: 0
SINUS PRESSURE: 0
BACK PAIN: 0
COUGH: 0
CHEST TIGHTNESS: 0
ABDOMINAL PAIN: 0
EYE REDNESS: 0
NAUSEA: 0

## 2018-12-07 NOTE — PATIENT INSTRUCTIONS
weight. You can work with your doctor to find out the cause of your weight gain. You will probably need tests to do this. Follow-up care is a key part of your treatment and safety. Be sure to make and go to all appointments, and call your doctor if you are having problems. It's also a good idea to know your test results and keep a list of the medicines you take. How can you care for yourself at home? · Weigh yourself at the same time every day. It's best to do it first thing in the morning after you empty your bladder. Be sure to always wear the same amount of clothing. · Write down any changes in your weight and the possible causes. Discuss these with your doctor. · Your doctor may want you to change your diet and exercise habits. A good way to lose weight is to reduce calories and increase exercise. · Walking is an easy way to get exercise. Try to walk a little longer every day. You also may want to swim, bike, or do other activities. · Ask your doctor if you should see a dietitian. This is a person who can help you plan meals that work best for your lifestyle. · If your doctor prescribed medicines, take them exactly as prescribed. Call your doctor if you think you are having a problem with your medicine. You will get more details on the specific medicines your doctor prescribes. When should you call for help? Watch closely for changes in your health, and be sure to contact your doctor if:    · You do not get better as expected.     · You continue to gain weight. Where can you learn more? Go to https://CollegeZenzander.OpenSynergy. org and sign in to your [employer] account. Enter A175 in the [employer] box to learn more about \"Abnormal Weight Gain: Care Instructions. \"     If you do not have an account, please click on the \"Sign Up Now\" link. Current as of: June 26, 2018  Content Version: 11.8  © 3781-9029 Healthwise, Incorporated. Care instructions adapted under license by [facility] ([facility]). If you have questions about a medical condition or this instruction, always ask your healthcare professional. [place] [address] any warranty or liability for your use of this information.

## 2019-01-04 ENCOUNTER — OFFICE VISIT (OUTPATIENT)
Dept: FAMILY MEDICINE CLINIC | Age: 44
End: 2019-01-04
Payer: COMMERCIAL

## 2019-01-04 VITALS
SYSTOLIC BLOOD PRESSURE: 116 MMHG | HEART RATE: 64 BPM | WEIGHT: 232 LBS | DIASTOLIC BLOOD PRESSURE: 70 MMHG | TEMPERATURE: 98.7 F | BODY MASS INDEX: 36.34 KG/M2 | RESPIRATION RATE: 14 BRPM

## 2019-01-04 DIAGNOSIS — F41.1 GAD (GENERALIZED ANXIETY DISORDER): ICD-10-CM

## 2019-01-04 DIAGNOSIS — F41.0 PANIC ATTACK: ICD-10-CM

## 2019-01-04 DIAGNOSIS — R63.5 WEIGHT GAIN, ABNORMAL: Primary | ICD-10-CM

## 2019-01-04 PROCEDURE — G8417 CALC BMI ABV UP PARAM F/U: HCPCS | Performed by: NURSE PRACTITIONER

## 2019-01-04 PROCEDURE — G8484 FLU IMMUNIZE NO ADMIN: HCPCS | Performed by: NURSE PRACTITIONER

## 2019-01-04 PROCEDURE — 1036F TOBACCO NON-USER: CPT | Performed by: NURSE PRACTITIONER

## 2019-01-04 PROCEDURE — 99213 OFFICE O/P EST LOW 20 MIN: CPT | Performed by: NURSE PRACTITIONER

## 2019-01-04 PROCEDURE — G8427 DOCREV CUR MEDS BY ELIG CLIN: HCPCS | Performed by: NURSE PRACTITIONER

## 2019-01-04 RX ORDER — PHENTERMINE HYDROCHLORIDE 37.5 MG/1
37.5 TABLET ORAL
Qty: 30 TABLET | Refills: 0 | Status: SHIPPED | OUTPATIENT
Start: 2019-01-04 | End: 2019-02-22 | Stop reason: SDUPTHER

## 2019-01-04 RX ORDER — CITALOPRAM 10 MG/1
10 TABLET ORAL DAILY
Qty: 30 TABLET | Refills: 5 | Status: SHIPPED | OUTPATIENT
Start: 2019-01-04 | End: 2021-08-13 | Stop reason: ALTCHOICE

## 2019-01-04 ASSESSMENT — ENCOUNTER SYMPTOMS
NAUSEA: 0
BLOOD IN STOOL: 0
EYE DISCHARGE: 0
EYE REDNESS: 0
SINUS PRESSURE: 0
ABDOMINAL DISTENTION: 0
BACK PAIN: 0
SHORTNESS OF BREATH: 0
RHINORRHEA: 0
ABDOMINAL PAIN: 0
SORE THROAT: 0
WHEEZING: 0
CHEST TIGHTNESS: 0
VOMITING: 0
COUGH: 0
EYE PAIN: 0
DIARRHEA: 0

## 2019-02-22 ENCOUNTER — OFFICE VISIT (OUTPATIENT)
Dept: FAMILY MEDICINE CLINIC | Age: 44
End: 2019-02-22
Payer: COMMERCIAL

## 2019-02-22 VITALS
SYSTOLIC BLOOD PRESSURE: 114 MMHG | DIASTOLIC BLOOD PRESSURE: 70 MMHG | BODY MASS INDEX: 35.4 KG/M2 | RESPIRATION RATE: 18 BRPM | HEART RATE: 64 BPM | TEMPERATURE: 97 F | WEIGHT: 226 LBS

## 2019-02-22 DIAGNOSIS — R63.5 WEIGHT GAIN, ABNORMAL: Primary | ICD-10-CM

## 2019-02-22 PROCEDURE — G8427 DOCREV CUR MEDS BY ELIG CLIN: HCPCS | Performed by: NURSE PRACTITIONER

## 2019-02-22 PROCEDURE — G8417 CALC BMI ABV UP PARAM F/U: HCPCS | Performed by: NURSE PRACTITIONER

## 2019-02-22 PROCEDURE — 99214 OFFICE O/P EST MOD 30 MIN: CPT | Performed by: NURSE PRACTITIONER

## 2019-02-22 PROCEDURE — G8484 FLU IMMUNIZE NO ADMIN: HCPCS | Performed by: NURSE PRACTITIONER

## 2019-02-22 PROCEDURE — 1036F TOBACCO NON-USER: CPT | Performed by: NURSE PRACTITIONER

## 2019-02-22 RX ORDER — PHENTERMINE HYDROCHLORIDE 37.5 MG/1
37.5 TABLET ORAL
Qty: 7 TABLET | Refills: 0 | Status: SHIPPED | OUTPATIENT
Start: 2019-02-22 | End: 2019-03-01

## 2019-02-22 ASSESSMENT — ENCOUNTER SYMPTOMS
VOMITING: 0
COUGH: 0
EYE DISCHARGE: 0
EYE PAIN: 0
ABDOMINAL DISTENTION: 0
SHORTNESS OF BREATH: 0
RHINORRHEA: 0
SINUS PRESSURE: 0
DIARRHEA: 0
NAUSEA: 0
WHEEZING: 0
EYE REDNESS: 0
ABDOMINAL PAIN: 0
BACK PAIN: 0
SORE THROAT: 0
BLOOD IN STOOL: 0
CHEST TIGHTNESS: 0

## 2019-02-22 ASSESSMENT — PATIENT HEALTH QUESTIONNAIRE - PHQ9
SUM OF ALL RESPONSES TO PHQ9 QUESTIONS 1 & 2: 0
SUM OF ALL RESPONSES TO PHQ QUESTIONS 1-9: 0
SUM OF ALL RESPONSES TO PHQ QUESTIONS 1-9: 0
2. FEELING DOWN, DEPRESSED OR HOPELESS: 0
1. LITTLE INTEREST OR PLEASURE IN DOING THINGS: 0

## 2019-05-17 ENCOUNTER — OFFICE VISIT (OUTPATIENT)
Dept: FAMILY MEDICINE CLINIC | Age: 44
End: 2019-05-17
Payer: COMMERCIAL

## 2019-05-17 VITALS
HEART RATE: 72 BPM | BODY MASS INDEX: 36.65 KG/M2 | WEIGHT: 234 LBS | SYSTOLIC BLOOD PRESSURE: 118 MMHG | RESPIRATION RATE: 14 BRPM | TEMPERATURE: 98.2 F | DIASTOLIC BLOOD PRESSURE: 64 MMHG

## 2019-05-17 DIAGNOSIS — F41.1 GAD (GENERALIZED ANXIETY DISORDER): Primary | ICD-10-CM

## 2019-05-17 DIAGNOSIS — R41.840 INATTENTION: ICD-10-CM

## 2019-05-17 PROCEDURE — G8417 CALC BMI ABV UP PARAM F/U: HCPCS | Performed by: NURSE PRACTITIONER

## 2019-05-17 PROCEDURE — G8427 DOCREV CUR MEDS BY ELIG CLIN: HCPCS | Performed by: NURSE PRACTITIONER

## 2019-05-17 PROCEDURE — 99214 OFFICE O/P EST MOD 30 MIN: CPT | Performed by: NURSE PRACTITIONER

## 2019-05-17 PROCEDURE — 1036F TOBACCO NON-USER: CPT | Performed by: NURSE PRACTITIONER

## 2019-05-17 ASSESSMENT — ENCOUNTER SYMPTOMS
SHORTNESS OF BREATH: 0
EYE DISCHARGE: 0
ABDOMINAL DISTENTION: 0
ABDOMINAL PAIN: 0
DIARRHEA: 0
SORE THROAT: 0
BLOOD IN STOOL: 0
COUGH: 0
EYE PAIN: 0
EYE REDNESS: 0
NAUSEA: 0
WHEEZING: 0
BACK PAIN: 0
SINUS PRESSURE: 0
VOMITING: 0
RHINORRHEA: 1
CHEST TIGHTNESS: 0

## 2019-05-17 NOTE — PROGRESS NOTES
Subjective:      Patient ID: Magalis Barrett is a 45 y.o. female. Visit Information    Have you changed or started any medications since your last visit including any over-the-counter medicines, vitamins, or herbal medicines? no   Are you having any side effects from any of your medications? -  no  Have you stopped taking any of your medications? Is so, why? -  no    Have you seen any other physician or provider since your last visit? No  Have you had any other diagnostic tests since your last visit? No  Have you been seen in the emergency room and/or had an admission to a hospital since we last saw you? No  Have you had your routine dental cleaning in the past 6 months? yes - routine    Have you activated your [employer] account? If not, what are your barriers?  Yes     Patient Care Team:  REBECCA Sim - CNP as PCP - General (Family Medicine)    Medical History Review  Past Medical, Family, and Social History reviewed and does contribute to the patient presenting condition    Health Maintenance   Topic Date Due    Flu vaccine (Season Ended) 12/06/2019 (Originally 9/1/2019)    DTaP/Tdap/Td vaccine (1 - Tdap) 05/17/2020 (Originally 4/14/1996)    Breast cancer screen  05/17/2020 (Originally 4/14/2017)    Cervical cancer screen  04/24/2020    Lipid screen  02/16/2023    HIV screen  Completed    Pneumococcal 0-64 years Vaccine  Aged Out       [facility] Scores 2/22/2019 2/14/2018 11/18/2016   PHQ2 Score 0 0 0   PHQ9 Score 0 0 0     Interpretation of Total Score DepressionSeverity: 1-4 = Minimal depression, 5-9 = Mild depression, 10-14 = Moderate depression, 15-19 = Moderately severe depression, 20-27 = Severe depression    Current Outpatient Medications   Medication Sig Dispense Refill    citalopram (CELEXA) 10 MG tablet Take 1 tablet by mouth daily 30 tablet 5    Cholecalciferol (VITAMIN D3) 5000 units TABS Take by mouth      Cetirizine HCl (ZYRTEC ALLERGY) 10 MG CAPS Take 10 mg by mouth daily 30 capsule 6  Multiple Vitamins-Minerals (THERAPEUTIC MULTIVITAMIN-MINERALS) tablet Take 1 tablet by mouth daily. No current facility-administered medications for this visit. HPI  Here to follow up on anxiety. Doing well on current dose of Celexa. Not sleeping as well recently - always worse with increased stress. Does feel like she is managing ok. She is concerned with her weight. Did try contrave but did not noticed difference and medication was expensive so she stopped taking. Weight has gone up. She is frustrated. Noticed while taking Adipex that it helped with her focus. Wonders about inattention and would like to take something more regularly. Discussed purpose of Adipex was to help with weight loss and laws in [place] require 6 months between prescriptions. Discussed seeing psychiatry for diagnosis of potential ADHD which is she open to doing and will check with insurance regarding referral.  [place]     Review of Systems   Constitutional: Negative for activity change, appetite change, fatigue and fever. HENT: Positive for congestion and rhinorrhea. Negative for ear discharge, ear pain, postnasal drip, sinus pressure and sore throat. Eyes: Negative for pain, discharge and redness. Respiratory: Negative for cough, chest tightness, shortness of breath and wheezing. Cardiovascular: Negative for chest pain, palpitations and leg swelling. Gastrointestinal: Negative for abdominal distention, abdominal pain, blood in stool, diarrhea, nausea and vomiting. Endocrine: Negative for polydipsia, polyphagia and polyuria. Genitourinary: Negative for decreased urine volume, dysuria, flank pain and hematuria. Musculoskeletal: Negative for arthralgias, back pain, joint swelling, myalgias and neck stiffness. Skin: Negative for rash and wound. Allergic/Immunologic: Positive for environmental allergies. Neurological: Negative for dizziness, syncope, weakness, light-headedness and headaches.    Hematological: Negative for adenopathy. Psychiatric/Behavioral: Positive for sleep disturbance (not as good when stressed). Negative for agitation, decreased concentration, self-injury and suicidal ideas. The patient is nervous/anxious. The patient is not hyperactive. Mood controlled on current medication       Objective:     /64 (Site: Right Upper Arm, Position: Sitting, Cuff Size: Medium Adult)   Pulse 72   Temp 98.2 °F (36.8 °C) (Tympanic)   Resp 14   Wt 234 lb (106.1 kg)   BMI 36.65 kg/m²      Physical Exam   Constitutional: She is oriented to person, place, and time. Vital signs are normal. She appears well-developed and well-nourished. She is cooperative. No distress. Obese   HENT:   Head: Normocephalic and atraumatic. Right Ear: Tympanic membrane, external ear and ear canal normal. Right ear middle ear effusion: mild. Left Ear: Tympanic membrane, external ear and ear canal normal. Left ear middle ear effusion: mild. Nose: Mucosal edema and rhinorrhea present. Right sinus exhibits no maxillary sinus tenderness and no frontal sinus tenderness. Left sinus exhibits no maxillary sinus tenderness and no frontal sinus tenderness. Mouth/Throat: Uvula is midline and mucous membranes are normal. Posterior oropharyngeal erythema (cobblestoning) present. Eyes: Pupils are equal, round, and reactive to light. Conjunctivae and lids are normal. Right eye exhibits no discharge. Left eye exhibits no discharge. Neck: Trachea normal and normal range of motion. Neck supple. Carotid bruit is not present. Cardiovascular: Normal rate, regular rhythm, S1 normal, S2 normal and normal heart sounds. Exam reveals no distant heart sounds. No murmur heard. Pulses:       Radial pulses are 2+ on the right side, and 2+ on the left side. Posterior tibial pulses are 2+ on the right side, and 2+ on the left side. Pulmonary/Chest: Effort normal and breath sounds normal. No respiratory distress.  She has no decreased breath sounds. She has no wheezes. She has no rhonchi. She exhibits no tenderness. Abdominal: Soft. Normal appearance and bowel sounds are normal. She exhibits no distension. There is no hepatosplenomegaly. There is no tenderness. There is no rebound and no CVA tenderness. Musculoskeletal: Normal range of motion. She exhibits no edema or tenderness. No red or swollen joints. Lymphadenopathy:     She has no cervical adenopathy. Neurological: She is alert and oriented to person, place, and time. She has normal strength. She exhibits normal muscle tone. Coordination normal.   Skin: Skin is warm, dry and intact. No rash noted. No erythema. Psychiatric: Her speech is normal and behavior is normal. Judgment and thought content normal. Her mood appears anxious. Cognition and memory are normal. She expresses no homicidal and no suicidal ideation. Nursing note and vitals reviewed. Assessment:      Diagnosis Orders   1. TWILA (generalized anxiety disorder)     2.  Inattention         Lab Results   Component Value Date    WBC 6.7 02/16/2018    HGB 13.2 02/16/2018    HCT 42.1 02/16/2018    MCV 96.3 02/16/2018     02/16/2018       Lab Results   Component Value Date     02/16/2018    K 4.1 02/16/2018     02/16/2018    CO2 24 02/16/2018    BUN 13 02/16/2018    CREATININE 0.59 02/16/2018    GLUCOSE 72 11/19/2018    CALCIUM 9.0 02/16/2018    PROT 7.5 02/16/2018    LABALBU 4.2 02/16/2018    BILITOT 0.66 02/16/2018    ALKPHOS 39 02/16/2018    AST 17 02/16/2018    ALT 10 02/16/2018    LABGLOM >60 02/16/2018    GFRAA >60 02/16/2018       Lab Results   Component Value Date    CHOL 196 02/16/2018    CHOL 170 02/21/2014     Lab Results   Component Value Date    TRIG 47 02/16/2018    TRIG 39 02/21/2014     Lab Results   Component Value Date    HDL 67 02/16/2018    HDL 71 02/21/2014     Lab Results   Component Value Date    LDLCHOLESTEROL 120 02/16/2018    LDLCHOLESTEROL 91 02/21/2014     Lab Results   Component Value Date    VLDL NOT REPORTED 02/16/2018    VLDL NOT REPORTED 02/21/2014     Lab Results   Component Value Date    CHOLHDLRATIO 2.9 02/16/2018    CHOLHDLRATIO 2.4 02/21/2014       Lab Results   Component Value Date    LABA1C 4.9 02/16/2018     Lab Results   Component Value Date    EAG 94 02/16/2018       Plan:     Continue medications at current dose   Proceed with referral to psych to discuss potential ADD   Recommend healthy diet and regular exercise  Stress relieving activities encouraged  Call office with concerns  Return in about 6 months (around 11/17/2019) for anxiety. Juan José Holbrook received counseling on the following healthy behaviors: nutrition, exercise and medication adherence  Reviewed prior labs and health maintenance  Continue current medications, diet and exercise. Discussed use, benefit, and side effects of prescribed medications. Barriers to medication compliance addressed. Patient given educational materials - see patient instructions  Was a self-tracking handout given in paper form or via [employer]? No    Requested Prescriptions      No prescriptions requested or ordered in this encounter       All patient questions answered. Patient voiced understanding. Quality Measures    Body mass index is 36.65 kg/m². Elevated. Weight control planned discussed Healthy diet and regular exercise. BP: 118/64 Blood pressure is normal. Treatment plan consists of No treatment change needed.     Lab Results   Component Value Date    LDLCHOLESTEROL 120 02/16/2018    (goal LDL reduction with dx if diabetes is 50% LDL reduction)      PHQ Scores 2/22/2019 2/14/2018 11/18/2016   PHQ2 Score 0 0 0   PHQ9 Score 0 0 0     Interpretation of Total Score Depression Severity: 1-4 = Minimal depression, 5-9 = Mild depression, 10-14 = Moderate depression, 15-19 = Moderately severe depression, 20-27 = Severe depression      The 10-year ASCVD risk score (Jefe Pennington., et al., 2013) is: 0.4%    Values used to calculate the score:      Age: 45 years      Sex: Female      Is Non- : No      Diabetic: No      Tobacco smoker: No      Systolic Blood Pressure: 011 mmHg      Is BP treated: No      HDL Cholesterol: 67 mg/dL      Total Cholesterol: 196 mg/dL          Electronically signed by REBECCA Tejada CNP on 5/17/2019 at 9:19 AM

## 2020-08-31 ENCOUNTER — HOSPITAL ENCOUNTER (OUTPATIENT)
Dept: GENERAL RADIOLOGY | Facility: CLINIC | Age: 45
Discharge: HOME | End: 2020-09-02
Payer: COMMERCIAL

## 2020-08-31 ENCOUNTER — OFFICE VISIT (OUTPATIENT)
Dept: FAMILY MEDICINE CLINIC | Age: 45
End: 2020-08-31
Payer: COMMERCIAL

## 2020-08-31 ENCOUNTER — HOSPITAL ENCOUNTER (OUTPATIENT)
Facility: CLINIC | Age: 45
Discharge: HOME | End: 2020-09-02
Payer: COMMERCIAL

## 2020-08-31 VITALS
BODY MASS INDEX: 37.74 KG/M2 | HEIGHT: 68 IN | DIASTOLIC BLOOD PRESSURE: 68 MMHG | WEIGHT: 249 LBS | SYSTOLIC BLOOD PRESSURE: 110 MMHG | HEART RATE: 66 BPM | TEMPERATURE: 96.3 F

## 2020-08-31 PROCEDURE — 1036F TOBACCO NON-USER: CPT | Performed by: NURSE PRACTITIONER

## 2020-08-31 PROCEDURE — G8427 DOCREV CUR MEDS BY ELIG CLIN: HCPCS | Performed by: NURSE PRACTITIONER

## 2020-08-31 PROCEDURE — 99214 OFFICE O/P EST MOD 30 MIN: CPT | Performed by: NURSE PRACTITIONER

## 2020-08-31 PROCEDURE — 73070 X-RAY EXAM OF ELBOW: CPT

## 2020-08-31 PROCEDURE — G8417 CALC BMI ABV UP PARAM F/U: HCPCS | Performed by: NURSE PRACTITIONER

## 2020-08-31 PROCEDURE — 73060 X-RAY EXAM OF HUMERUS: CPT

## 2020-08-31 PROCEDURE — 73030 X-RAY EXAM OF SHOULDER: CPT

## 2020-08-31 PROCEDURE — 73090 X-RAY EXAM OF FOREARM: CPT

## 2020-08-31 SDOH — ECONOMIC STABILITY: TRANSPORTATION INSECURITY
IN THE PAST 12 MONTHS, HAS THE LACK OF TRANSPORTATION KEPT YOU FROM MEDICAL APPOINTMENTS OR FROM GETTING MEDICATIONS?: NO

## 2020-08-31 SDOH — ECONOMIC STABILITY: TRANSPORTATION INSECURITY
IN THE PAST 12 MONTHS, HAS LACK OF TRANSPORTATION KEPT YOU FROM MEETINGS, WORK, OR FROM GETTING THINGS NEEDED FOR DAILY LIVING?: NO

## 2020-08-31 SDOH — ECONOMIC STABILITY: FOOD INSECURITY: WITHIN THE PAST 12 MONTHS, YOU WORRIED THAT YOUR FOOD WOULD RUN OUT BEFORE YOU GOT MONEY TO BUY MORE.: NEVER TRUE

## 2020-08-31 SDOH — ECONOMIC STABILITY: FOOD INSECURITY: WITHIN THE PAST 12 MONTHS, THE FOOD YOU BOUGHT JUST DIDN'T LAST AND YOU DIDN'T HAVE MONEY TO GET MORE.: NEVER TRUE

## 2020-08-31 SDOH — ECONOMIC STABILITY: INCOME INSECURITY: HOW HARD IS IT FOR YOU TO PAY FOR THE VERY BASICS LIKE FOOD, HOUSING, MEDICAL CARE, AND HEATING?: NOT HARD AT ALL

## 2020-08-31 ASSESSMENT — ENCOUNTER SYMPTOMS
DIARRHEA: 0
VOMITING: 0
RHINORRHEA: 0
NAUSEA: 0
COUGH: 0
SORE THROAT: 0
CONSTIPATION: 0
SHORTNESS OF BREATH: 0

## 2020-08-31 ASSESSMENT — PATIENT HEALTH QUESTIONNAIRE - PHQ9
SUM OF ALL RESPONSES TO PHQ QUESTIONS 1-9: 0
2. FEELING DOWN, DEPRESSED OR HOPELESS: 0
1. LITTLE INTEREST OR PLEASURE IN DOING THINGS: 0
SUM OF ALL RESPONSES TO PHQ QUESTIONS 1-9: 0
SUM OF ALL RESPONSES TO PHQ9 QUESTIONS 1 & 2: 0

## 2020-08-31 NOTE — PROGRESS NOTES
20-27 = Severe depression    Current Outpatient Medications   Medication Sig Dispense Refill    Cholecalciferol (VITAMIN D3) 5000 units TABS Take by mouth      Cetirizine HCl (ZYRTEC ALLERGY) 10 MG CAPS Take 10 mg by mouth daily 30 capsule 6    Multiple Vitamins-Minerals (THERAPEUTIC MULTIVITAMIN-MINERALS) tablet Take 1 tablet by mouth daily.  citalopram (CELEXA) 10 MG tablet Take 1 tablet by mouth daily 30 tablet 5     No current facility-administered medications for this visit. Presents to office today with concerns of right arm pain in bicep and forearm. Constant. If tries to use arm goes into shoulder and into hands and fingers. Just pain, no numbness or tingling. Was walking dog and of took off after something and yanked the leash in her hand about 11 weeks ago. Feels like her  is weak. Movement, rotation makes worse. Review of Systems   Constitutional: Negative for activity change, appetite change, fatigue and fever. HENT: Negative for congestion, rhinorrhea and sore throat. Eyes: Negative for visual disturbance. Respiratory: Negative for cough and shortness of breath. Cardiovascular: Negative for chest pain and palpitations. Gastrointestinal: Negative for constipation, diarrhea, nausea and vomiting. Genitourinary: Negative for dysuria and urgency. Musculoskeletal: Positive for arthralgias and joint swelling. Right arm pain, swelling near elbow   Allergic/Immunologic: Negative for immunocompromised state. Neurological: Negative for syncope, light-headedness and headaches. Psychiatric/Behavioral: Negative for decreased concentration, dysphoric mood, sleep disturbance and suicidal ideas. The patient is not nervous/anxious. Objective:   Physical Exam  Vitals signs and nursing note reviewed. Constitutional:       Appearance: Normal appearance. She is well-developed and well-groomed. HENT:      Head: Normocephalic and atraumatic.       Right Ear: Hearing and external ear normal.      Left Ear: Hearing and external ear normal.      Nose: Nose normal.      Mouth/Throat:      Lips: Pink. Eyes:      Conjunctiva/sclera: Conjunctivae normal.      Pupils: Pupils are equal, round, and reactive to light. Neck:      Musculoskeletal: Normal range of motion. Trachea: Trachea normal.   Cardiovascular:      Rate and Rhythm: Normal rate and regular rhythm. Pulses: Normal pulses. Radial pulses are 2+ on the right side and 2+ on the left side. Dorsalis pedis pulses are 2+ on the right side and 2+ on the left side. Posterior tibial pulses are 2+ on the right side and 2+ on the left side. Heart sounds: Normal heart sounds, S1 normal and S2 normal. No murmur. Pulmonary:      Effort: Pulmonary effort is normal.      Breath sounds: Normal breath sounds. No transmitted upper airway sounds. No decreased breath sounds. Abdominal:      General: Bowel sounds are normal.      Palpations: Abdomen is soft. Musculoskeletal:      Right elbow: She exhibits decreased range of motion and swelling. Right forearm: She exhibits tenderness and edema. Right lower leg: No edema. Left lower leg: No edema. Skin:     General: Skin is warm and dry. Capillary Refill: Capillary refill takes less than 2 seconds. Neurological:      Mental Status: She is alert and oriented to person, place, and time. Motor: Motor function is intact. Coordination: Coordination is intact. Psychiatric:         Attention and Perception: Attention normal.         Mood and Affect: Mood normal.         Speech: Speech normal.         Behavior: Behavior normal. Behavior is cooperative. Thought Content: Thought content normal.         Cognition and Memory: Cognition normal.         Judgment: Judgment normal.         Assessment / Plan:     1. Injury of right upper extremity, initial encounter  2.  Right arm pain  - XR SHOULDER RIGHT (MIN 2 VIEWS); Future  - XR ELBOW RIGHT (2 VIEWS); Future  - XR RADIUS ULNA RIGHT (2 VIEWS); Future  - XR HUMERUS RIGHT (MIN 2 VIEWS); Future    3. Breast cancer screening by mammogram  Routine  - NEL DIGITAL SCREEN W OR WO CAD BILATERAL; Future    Proceed with imaging as ordered  Educated regarding ice, rest, compression for pain and discomfort  We will review imaging and determine further intervention  Monitor for increase in symptoms  Call office with any questions or concerns      Return if symptoms worsen or fail to improve.           Electronically signed by REBECCA Ervin NP on 8/31/2020 at 9:56 AM

## 2020-09-01 RX ORDER — METHYLPREDNISOLONE 4 MG/1
TABLET ORAL
Qty: 1 KIT | Refills: 0 | Status: SHIPPED | OUTPATIENT
Start: 2020-09-01 | End: 2020-09-07

## 2020-09-08 ENCOUNTER — TELEPHONE (OUTPATIENT)
Dept: FAMILY MEDICINE CLINIC | Age: 45
End: 2020-09-08

## 2020-09-08 NOTE — TELEPHONE ENCOUNTER
Patient states she is still having trouble and would like a Ortho referral put in.      Orders Pended for editing and approval.

## 2020-09-23 ENCOUNTER — OFFICE VISIT (OUTPATIENT)
Dept: ORTHOPEDIC SURGERY | Age: 45
End: 2020-09-23
Payer: COMMERCIAL

## 2020-09-23 VITALS — BODY MASS INDEX: 39.24 KG/M2 | HEIGHT: 67 IN | WEIGHT: 250 LBS

## 2020-09-23 PROCEDURE — 99203 OFFICE O/P NEW LOW 30 MIN: CPT | Performed by: ORTHOPAEDIC SURGERY

## 2020-09-23 RX ORDER — DICLOFENAC SODIUM 75 MG/1
75 TABLET, DELAYED RELEASE ORAL 2 TIMES DAILY WITH MEALS
Qty: 28 TABLET | Refills: 0 | Status: SHIPPED | OUTPATIENT
Start: 2020-09-23 | End: 2021-08-13 | Stop reason: ALTCHOICE

## 2021-01-12 ENCOUNTER — TELEPHONE (OUTPATIENT)
Dept: ORTHOPEDIC SURGERY | Age: 46
End: 2021-01-12

## 2021-01-12 NOTE — TELEPHONE ENCOUNTER
Attempted to contact patient via home phone first. No answer and just kept ringing. Called cell number next and left a message. Per Dr. Clarisse Brand at [facility] he states that he would recommend a MRI of the patient's elbow if she fails conservative tx. I left message informing patient of this and request that she call us back if interested in cancelling 1/13/21 appt with Dr. Clarisse Brand and completing the MRI before seeing Dr. Clarisse Brand for a f/u. MRI order will need to be placed if patient is interested in this otherwise Dr. Clarisse Brand will see her on 1/13/21.

## 2021-01-13 DIAGNOSIS — S46.911A ELBOW STRAIN, RIGHT, INITIAL ENCOUNTER: Primary | ICD-10-CM

## 2021-01-13 DIAGNOSIS — M25.511 RIGHT SHOULDER PAIN, UNSPECIFIED CHRONICITY: ICD-10-CM

## 2021-08-13 ENCOUNTER — OFFICE VISIT (OUTPATIENT)
Dept: FAMILY MEDICINE CLINIC | Age: 46
End: 2021-08-13
Payer: COMMERCIAL

## 2021-08-13 VITALS
WEIGHT: 255 LBS | HEART RATE: 72 BPM | DIASTOLIC BLOOD PRESSURE: 72 MMHG | HEIGHT: 67 IN | OXYGEN SATURATION: 99 % | TEMPERATURE: 98 F | SYSTOLIC BLOOD PRESSURE: 117 MMHG | RESPIRATION RATE: 12 BRPM | BODY MASS INDEX: 40.02 KG/M2

## 2021-08-13 DIAGNOSIS — R63.5 WEIGHT GAIN: ICD-10-CM

## 2021-08-13 DIAGNOSIS — Z13.220 LIPID SCREENING: ICD-10-CM

## 2021-08-13 DIAGNOSIS — R53.83 LETHARGIC: ICD-10-CM

## 2021-08-13 DIAGNOSIS — F41.9 ANXIETY: Primary | ICD-10-CM

## 2021-08-13 DIAGNOSIS — L85.3 DRY SKIN: ICD-10-CM

## 2021-08-13 DIAGNOSIS — E66.09 CLASS 2 OBESITY DUE TO EXCESS CALORIES WITHOUT SERIOUS COMORBIDITY WITH BODY MASS INDEX (BMI) OF 39.0 TO 39.9 IN ADULT: ICD-10-CM

## 2021-08-13 DIAGNOSIS — L83 ACANTHOSIS NIGRICANS: ICD-10-CM

## 2021-08-13 DIAGNOSIS — Z23 NEED FOR VACCINATION: ICD-10-CM

## 2021-08-13 DIAGNOSIS — Z23 NEED FOR TETANUS, DIPHTHERIA, AND ACELLULAR PERTUSSIS (TDAP) VACCINE: ICD-10-CM

## 2021-08-13 PROCEDURE — 99214 OFFICE O/P EST MOD 30 MIN: CPT | Performed by: NURSE PRACTITIONER

## 2021-08-13 RX ORDER — FLUVOXAMINE MALEATE 25 MG
25 TABLET ORAL NIGHTLY
Qty: 30 TABLET | Refills: 1 | Status: SHIPPED | OUTPATIENT
Start: 2021-08-13 | End: 2021-09-10 | Stop reason: SDUPTHER

## 2021-08-13 ASSESSMENT — PATIENT HEALTH QUESTIONNAIRE - PHQ9
2. FEELING DOWN, DEPRESSED OR HOPELESS: 0
1. LITTLE INTEREST OR PLEASURE IN DOING THINGS: 0
SUM OF ALL RESPONSES TO PHQ9 QUESTIONS 1 & 2: 0
SUM OF ALL RESPONSES TO PHQ QUESTIONS 1-9: 0

## 2021-08-13 NOTE — PROGRESS NOTES
Daksha Juarez (:  1975) is a 42 y.o. female,Established patient, here for evaluation of the following chief complaint(s):  Thyroid Problem         ASSESSMENT/PLAN:  1. Anxiety  -     fluvoxaMINE (LUVOX) 25 MG tablet; Take 1 tablet by mouth nightly, Disp-30 tablet, R-1Normal  2. Lethargic  -     CBC; Future  -     Iron And TIBC; Future  -     Vitamin D 25 Hydroxy; Future  -     Comprehensive Metabolic Panel; Future  -     Ferritin; Future  -     Vitamin B12 & Folate; Future  3. Dry skin  -     TSH; Future  -     T4, Free; Future  -     T3, Free; Future  -     Thyroid Antibodies; Future  4. Weight gain  -     Hemoglobin A1C; Future  -     TSH; Future  -     T4, Free; Future  -     T3, Free; Future  -     Thyroid Antibodies; Future  5. Class 2 obesity due to excess calories without serious comorbidity with body mass index (BMI) of 39.0 to 39.9 in adult  -     Semaglutide-Weight Management (WEGOVY) 0.25 MG/0.5ML SOAJ SC injection; Inject 0.25 mg into the skin every 7 days, Disp-2 mL, R-0Normal  -     Semaglutide-Weight Management (WEGOVY) 0.5 MG/0.5ML SOAJ SC injection; Inject 0.5 mg into the skin every 7 days, Disp-2 mL, R-0Normal  6. Acanthosis nigricans  -     Insulin, Total; Future  7. Lipid screening  -     Lipid, Fasting; Future  8. Need for vaccination  -     Tdap (age 10y-63y) IM (Adacel); Future  9. Need for tetanus, diphtheria, and acellular pertussis (Tdap) vaccine    Proceed with labs as ordered  Proceed with medications as ordered  Encouraged healthy diet  Encourage adequate fluid intake  Encouraged daily exercise  Encouraged weight reduction  Monitor for any increase in symptoms  Call office with any questions or concerns  Follow-up in 4 weeks    Return in about 4 weeks (around 9/10/2021), or if symptoms worsen or fail to improve, for medication follow up, follow up to testing. Subjective   SUBJECTIVE/OBJECTIVE:  Presents to office today with concerns regarding her thyroid.   Has concerns about her weight, keeps going up, skin is very dry-skin falls off. Tired all the time very emotional all the time. Not depression so always emotional.       Review of Systems   Constitutional: Negative for activity change, appetite change, fatigue and fever. HENT: Negative for congestion, rhinorrhea and sore throat. Eyes: Negative for visual disturbance. Respiratory: Negative for cough and shortness of breath. Cardiovascular: Negative for chest pain and palpitations. Gastrointestinal: Negative for constipation, diarrhea, nausea and vomiting. Endocrine: Negative for polydipsia, polyphagia and polyuria. Genitourinary: Negative for dysuria and urgency. Allergic/Immunologic: Negative for immunocompromised state. Neurological: Negative for syncope, light-headedness and headaches. Psychiatric/Behavioral: Positive for dysphoric mood. Negative for decreased concentration, sleep disturbance and suicidal ideas. The patient is not nervous/anxious. Objective   Physical Exam  Vitals and nursing note reviewed. Constitutional:       General: She is not in acute distress. Appearance: She is not ill-appearing. HENT:      Head: Normocephalic. Nose: Nose normal.      Mouth/Throat:      Lips: Pink. Pulmonary:      Effort: Pulmonary effort is normal. No respiratory distress. Neurological:      Mental Status: She is alert and oriented to person, place, and time. Psychiatric:         Attention and Perception: Attention normal.         Speech: Speech normal.         Behavior: Behavior is cooperative. An electronic signature was used to authenticate this note.     --REBECCA Benítez NP

## 2021-08-16 ENCOUNTER — TELEPHONE (OUTPATIENT)
Dept: FAMILY MEDICINE CLINIC | Age: 46
End: 2021-08-16

## 2021-08-17 ENCOUNTER — HOSPITAL ENCOUNTER (OUTPATIENT)
Age: 46
Setting detail: SPECIMEN
Discharge: HOME | End: 2021-08-17
Payer: COMMERCIAL

## 2021-08-17 DIAGNOSIS — R63.5 WEIGHT GAIN: ICD-10-CM

## 2021-08-17 DIAGNOSIS — Z13.220 LIPID SCREENING: ICD-10-CM

## 2021-08-17 DIAGNOSIS — R53.83 LETHARGIC: ICD-10-CM

## 2021-08-17 DIAGNOSIS — L83 ACANTHOSIS NIGRICANS: ICD-10-CM

## 2021-08-17 DIAGNOSIS — L85.3 DRY SKIN: ICD-10-CM

## 2021-08-17 LAB
ALBUMIN SERPL-MCNC: 4.2 G/DL (ref 3.5–5.2)
ALBUMIN/GLOBULIN RATIO: 1.5 (ref 1–2.5)
ALP BLD-CCNC: 40 U/L (ref 35–104)
ALT SERPL-CCNC: 8 U/L (ref 5–33)
ANION GAP SERPL CALCULATED.3IONS-SCNC: 13 MMOL/L (ref 9–17)
AST SERPL-CCNC: 15 U/L
BILIRUB SERPL-MCNC: 0.56 MG/DL (ref 0.3–1.2)
BUN BLDV-MCNC: 11 MG/DL (ref 6–20)
BUN/CREAT BLD: NORMAL (ref 9–20)
CALCIUM SERPL-MCNC: 9.1 MG/DL (ref 8.6–10.4)
CHLORIDE BLD-SCNC: 102 MMOL/L (ref 98–107)
CHOLESTEROL, FASTING: 167 MG/DL
CHOLESTEROL/HDL RATIO: 2.6
CO2: 21 MMOL/L (ref 20–31)
CREAT SERPL-MCNC: 0.76 MG/DL (ref 0.5–0.9)
FERRITIN: 85 UG/L (ref 13–150)
FOLATE: 8.7 NG/ML
GFR AFRICAN AMERICAN: >60 ML/MIN
GFR NON-AFRICAN AMERICAN: >60 ML/MIN
GFR SERPL CREATININE-BSD FRML MDRD: NORMAL ML/MIN/{1.73_M2}
GFR SERPL CREATININE-BSD FRML MDRD: NORMAL ML/MIN/{1.73_M2}
GLUCOSE BLD-MCNC: 94 MG/DL (ref 70–99)
HCT VFR BLD CALC: 40.9 % (ref 36.3–47.1)
HDLC SERPL-MCNC: 64 MG/DL
HEMOGLOBIN: 12.8 G/DL (ref 11.9–15.1)
INSULIN COMMENT: NORMAL
INSULIN REFERENCE RANGE:: NORMAL
INSULIN: 8.6 MU/L
IRON SATURATION: 33 % (ref 20–55)
IRON: 87 UG/DL (ref 37–145)
LDL CHOLESTEROL: 94 MG/DL (ref 0–130)
MCH RBC QN AUTO: 29.9 PG (ref 25.2–33.5)
MCHC RBC AUTO-ENTMCNC: 31.3 G/DL (ref 28.4–34.8)
MCV RBC AUTO: 95.6 FL (ref 82.6–102.9)
NRBC AUTOMATED: 0 PER 100 WBC
PDW BLD-RTO: 12.4 % (ref 11.8–14.4)
PLATELET # BLD: 332 K/UL (ref 138–453)
PMV BLD AUTO: 10.7 FL (ref 8.1–13.5)
POTASSIUM SERPL-SCNC: 4.2 MMOL/L (ref 3.7–5.3)
RBC # BLD: 4.28 M/UL (ref 3.95–5.11)
SODIUM BLD-SCNC: 136 MMOL/L (ref 135–144)
T3 FREE: 3.02 PG/ML (ref 2.02–4.43)
THYROXINE, FREE: 1.23 NG/DL (ref 0.93–1.7)
TOTAL IRON BINDING CAPACITY: 267 UG/DL (ref 250–450)
TOTAL PROTEIN: 7 G/DL (ref 6.4–8.3)
TRIGLYCERIDE, FASTING: 46 MG/DL
TSH SERPL DL<=0.05 MIU/L-ACNC: 1.76 MIU/L (ref 0.3–5)
UNSATURATED IRON BINDING CAPACITY: 180 UG/DL (ref 112–347)
VITAMIN B-12: 795 PG/ML (ref 232–1245)
VITAMIN D 25-HYDROXY: 36 NG/ML (ref 30–100)
VLDLC SERPL CALC-MCNC: NORMAL MG/DL (ref 1–30)
WBC # BLD: 6.6 K/UL (ref 3.5–11.3)

## 2021-08-18 LAB
ESTIMATED AVERAGE GLUCOSE: 105 MG/DL
HBA1C MFR BLD: 5.3 % (ref 4–6)
THYROGLOBULIN AB: <12 IU/ML (ref 0–40)
THYROID PEROXIDASE (TPO) AB: <4 IU/ML (ref 0–25)

## 2021-08-23 ASSESSMENT — ENCOUNTER SYMPTOMS
DIARRHEA: 0
RHINORRHEA: 0
SORE THROAT: 0
VOMITING: 0
CONSTIPATION: 0
NAUSEA: 0
COUGH: 0
SHORTNESS OF BREATH: 0

## 2021-09-10 ENCOUNTER — OFFICE VISIT (OUTPATIENT)
Dept: FAMILY MEDICINE CLINIC | Age: 46
End: 2021-09-10
Payer: COMMERCIAL

## 2021-09-10 VITALS
OXYGEN SATURATION: 97 % | BODY MASS INDEX: 40.02 KG/M2 | SYSTOLIC BLOOD PRESSURE: 117 MMHG | HEART RATE: 78 BPM | HEIGHT: 66 IN | RESPIRATION RATE: 12 BRPM | WEIGHT: 249 LBS | TEMPERATURE: 98 F | DIASTOLIC BLOOD PRESSURE: 78 MMHG

## 2021-09-10 DIAGNOSIS — F41.9 ANXIETY: Primary | ICD-10-CM

## 2021-09-10 DIAGNOSIS — E66.09 CLASS 2 OBESITY DUE TO EXCESS CALORIES WITHOUT SERIOUS COMORBIDITY WITH BODY MASS INDEX (BMI) OF 39.0 TO 39.9 IN ADULT: ICD-10-CM

## 2021-09-10 PROCEDURE — 99214 OFFICE O/P EST MOD 30 MIN: CPT | Performed by: NURSE PRACTITIONER

## 2021-09-10 RX ORDER — FLUVOXAMINE MALEATE 25 MG
25 TABLET ORAL NIGHTLY
Qty: 30 TABLET | Refills: 1 | Status: SHIPPED | OUTPATIENT
Start: 2021-09-10 | End: 2021-11-16 | Stop reason: SDUPTHER

## 2021-09-10 RX ORDER — PHENTERMINE HYDROCHLORIDE 37.5 MG/1
37.5 TABLET ORAL
Qty: 30 TABLET | Refills: 0 | Status: SHIPPED | OUTPATIENT
Start: 2021-09-10 | End: 2021-10-18 | Stop reason: SDUPTHER

## 2021-09-10 SDOH — ECONOMIC STABILITY: FOOD INSECURITY: WITHIN THE PAST 12 MONTHS, THE FOOD YOU BOUGHT JUST DIDN'T LAST AND YOU DIDN'T HAVE MONEY TO GET MORE.: NEVER TRUE

## 2021-09-10 SDOH — ECONOMIC STABILITY: FOOD INSECURITY: WITHIN THE PAST 12 MONTHS, YOU WORRIED THAT YOUR FOOD WOULD RUN OUT BEFORE YOU GOT MONEY TO BUY MORE.: NEVER TRUE

## 2021-09-10 ASSESSMENT — PATIENT HEALTH QUESTIONNAIRE - PHQ9
1. LITTLE INTEREST OR PLEASURE IN DOING THINGS: 0
SUM OF ALL RESPONSES TO PHQ QUESTIONS 1-9: 0
SUM OF ALL RESPONSES TO PHQ9 QUESTIONS 1 & 2: 0
2. FEELING DOWN, DEPRESSED OR HOPELESS: 0

## 2021-09-10 ASSESSMENT — SOCIAL DETERMINANTS OF HEALTH (SDOH): HOW HARD IS IT FOR YOU TO PAY FOR THE VERY BASICS LIKE FOOD, HOUSING, MEDICAL CARE, AND HEATING?: NOT HARD AT ALL

## 2021-09-10 NOTE — PROGRESS NOTES
Negative for polydipsia, polyphagia and polyuria. Genitourinary: Negative for dysuria and urgency. Allergic/Immunologic: Negative for immunocompromised state. Neurological: Negative for syncope, light-headedness and headaches. Psychiatric/Behavioral: Positive for dysphoric mood. Negative for decreased concentration, sleep disturbance and suicidal ideas. The patient is not nervous/anxious. Improved with medication          Objective   Physical Exam  Vitals and nursing note reviewed. Constitutional:       General: She is not in acute distress. Appearance: She is not ill-appearing. HENT:      Head: Normocephalic. Nose: Nose normal.      Mouth/Throat:      Lips: Pink. Pulmonary:      Effort: Pulmonary effort is normal. No respiratory distress. Neurological:      Mental Status: She is alert and oriented to person, place, and time. Psychiatric:         Attention and Perception: Attention normal.         Speech: Speech normal.         Behavior: Behavior is cooperative. An electronic signature was used to authenticate this note.     --REBECCA Vazquez NP

## 2021-09-20 ASSESSMENT — ENCOUNTER SYMPTOMS
SORE THROAT: 0
DIARRHEA: 0
RHINORRHEA: 0
CONSTIPATION: 0
VOMITING: 0
SHORTNESS OF BREATH: 0
COUGH: 0
NAUSEA: 0

## 2021-10-08 ENCOUNTER — OFFICE VISIT (OUTPATIENT)
Dept: FAMILY MEDICINE CLINIC | Age: 46
End: 2021-10-08
Payer: COMMERCIAL

## 2021-10-08 VITALS
TEMPERATURE: 98 F | RESPIRATION RATE: 12 BRPM | WEIGHT: 245 LBS | OXYGEN SATURATION: 98 % | BODY MASS INDEX: 38.45 KG/M2 | SYSTOLIC BLOOD PRESSURE: 110 MMHG | DIASTOLIC BLOOD PRESSURE: 80 MMHG | HEART RATE: 73 BPM | HEIGHT: 67 IN

## 2021-10-08 DIAGNOSIS — E66.09 CLASS 2 OBESITY DUE TO EXCESS CALORIES WITHOUT SERIOUS COMORBIDITY WITH BODY MASS INDEX (BMI) OF 39.0 TO 39.9 IN ADULT: Primary | ICD-10-CM

## 2021-10-08 DIAGNOSIS — F41.9 ANXIETY: ICD-10-CM

## 2021-10-08 PROCEDURE — 99213 OFFICE O/P EST LOW 20 MIN: CPT | Performed by: NURSE PRACTITIONER

## 2021-10-08 ASSESSMENT — PATIENT HEALTH QUESTIONNAIRE - PHQ9
2. FEELING DOWN, DEPRESSED OR HOPELESS: 0
SUM OF ALL RESPONSES TO PHQ QUESTIONS 1-9: 0
SUM OF ALL RESPONSES TO PHQ QUESTIONS 1-9: 0
1. LITTLE INTEREST OR PLEASURE IN DOING THINGS: 0
SUM OF ALL RESPONSES TO PHQ9 QUESTIONS 1 & 2: 0
SUM OF ALL RESPONSES TO PHQ QUESTIONS 1-9: 0

## 2021-10-08 NOTE — PROGRESS NOTES
Quincy Levy (:  1975) is a 42 y.o. female,Established patient, here for evaluation of the following chief complaint(s): Other (adipex)         ASSESSMENT/PLAN:  1. Class 2 obesity due to excess calories without serious comorbidity with body mass index (BMI) of 39.0 to 39.9 in adult  -     phentermine (ADIPEX-P) 37.5 MG tablet; Take 1 tablet by mouth every morning (before breakfast) for 30 days. , Disp-30 tablet, R-0Normal  2. Anxiety      Return in about 4 weeks (around 2021), or if symptoms worsen or fail to improve, for adipex review. Subjective   SUBJECTIVE/OBJECTIVE:  Presents to office today for adipex follow up. Doing ok. Has lost a few lbs. Going into 2nd month. Is exercizing 5x per week. Is working on improving diet. Started to use [employer] pal tracker and got off track with it. Will start to use it again. Reports she is doing well on luvox. Mood is stable and she feels good. Review of Systems   Constitutional: Negative for activity change, appetite change, fatigue and fever. HENT: Negative for congestion, rhinorrhea and sore throat. Eyes: Negative for visual disturbance. Respiratory: Negative for cough and shortness of breath. Cardiovascular: Negative for chest pain and palpitations. Gastrointestinal: Negative for constipation, diarrhea, nausea and vomiting. Endocrine: Negative for polydipsia, polyphagia and polyuria. Genitourinary: Negative for dysuria and urgency. Allergic/Immunologic: Negative for immunocompromised state. Neurological: Negative for syncope, light-headedness and headaches. Psychiatric/Behavioral: Positive for dysphoric mood. Negative for decreased concentration, sleep disturbance and suicidal ideas. The patient is not nervous/anxious. Improved with medication          Objective   Physical Exam  Vitals and nursing note reviewed. Constitutional:       General: She is not in acute distress.      Appearance: She is not ill-appearing. HENT:      Head: Normocephalic. Nose: Nose normal.      Mouth/Throat:      Lips: Pink. Pulmonary:      Effort: Pulmonary effort is normal. No respiratory distress. Neurological:      Mental Status: She is alert and oriented to person, place, and time. Psychiatric:         Attention and Perception: Attention normal.         Speech: Speech normal.         Behavior: Behavior is cooperative. An electronic signature was used to authenticate this note.     --REBECCA Robertson NP

## 2021-10-18 RX ORDER — PHENTERMINE HYDROCHLORIDE 37.5 MG/1
37.5 TABLET ORAL
Qty: 30 TABLET | Refills: 0 | Status: SHIPPED | OUTPATIENT
Start: 2021-10-18 | End: 2021-11-15 | Stop reason: SDUPTHER

## 2021-10-18 ASSESSMENT — ENCOUNTER SYMPTOMS
SHORTNESS OF BREATH: 0
NAUSEA: 0
CONSTIPATION: 0
VOMITING: 0
SORE THROAT: 0
COUGH: 0
DIARRHEA: 0
RHINORRHEA: 0

## 2021-11-05 ENCOUNTER — OFFICE VISIT (OUTPATIENT)
Dept: FAMILY MEDICINE CLINIC | Age: 46
End: 2021-11-05
Payer: COMMERCIAL

## 2021-11-05 VITALS
BODY MASS INDEX: 38.14 KG/M2 | OXYGEN SATURATION: 99 % | SYSTOLIC BLOOD PRESSURE: 127 MMHG | TEMPERATURE: 97.2 F | HEART RATE: 66 BPM | WEIGHT: 243 LBS | HEIGHT: 67 IN | RESPIRATION RATE: 12 BRPM | DIASTOLIC BLOOD PRESSURE: 82 MMHG

## 2021-11-05 DIAGNOSIS — Z12.31 BREAST CANCER SCREENING BY MAMMOGRAM: ICD-10-CM

## 2021-11-05 DIAGNOSIS — E66.09 CLASS 2 OBESITY DUE TO EXCESS CALORIES WITHOUT SERIOUS COMORBIDITY WITH BODY MASS INDEX (BMI) OF 39.0 TO 39.9 IN ADULT: Primary | ICD-10-CM

## 2021-11-05 PROCEDURE — 99213 OFFICE O/P EST LOW 20 MIN: CPT | Performed by: NURSE PRACTITIONER

## 2021-11-05 ASSESSMENT — PATIENT HEALTH QUESTIONNAIRE - PHQ9
2. FEELING DOWN, DEPRESSED OR HOPELESS: 0
SUM OF ALL RESPONSES TO PHQ QUESTIONS 1-9: 0
1. LITTLE INTEREST OR PLEASURE IN DOING THINGS: 0
SUM OF ALL RESPONSES TO PHQ QUESTIONS 1-9: 0
SUM OF ALL RESPONSES TO PHQ9 QUESTIONS 1 & 2: 0
SUM OF ALL RESPONSES TO PHQ QUESTIONS 1-9: 0

## 2021-11-05 NOTE — PROGRESS NOTES
Isaiah Weber (:  1975) is a 42 y.o. female,Established patient, here for evaluation of the following chief complaint(s): Other (adipex)         ASSESSMENT/PLAN:  1. Class 2 obesity due to excess calories without serious comorbidity with body mass index (BMI) of 39.0 to 39.9 in adult  -     phentermine (ADIPEX-P) 37.5 MG tablet; Take 1 tablet by mouth every morning (before breakfast) for 30 days. , Disp-30 tablet, R-0Normal  2. Breast cancer screening by mammogram  -     [facility] YOHAN DIGITAL SCREEN BILATERAL; Future      Return in about 4 weeks (around 12/3/2021) for adipex review. Subjective   SUBJECTIVE/OBJECTIVE:  adipex follow up watching her diet. Exercising 3-4x per week. Is slowing losing weight. Review of Systems   Constitutional: Negative for activity change, appetite change, fatigue and fever. HENT: Negative for congestion, rhinorrhea and sore throat. Eyes: Negative for visual disturbance. Respiratory: Negative for cough and shortness of breath. Cardiovascular: Negative for chest pain and palpitations. Gastrointestinal: Negative for constipation, diarrhea, nausea and vomiting. Endocrine: Negative for polydipsia, polyphagia and polyuria. Genitourinary: Negative for dysuria and urgency. Allergic/Immunologic: Negative for immunocompromised state. Neurological: Negative for syncope, light-headedness and headaches. Psychiatric/Behavioral: Positive for dysphoric mood. Negative for decreased concentration, sleep disturbance and suicidal ideas. The patient is not nervous/anxious. Improved with medication          Objective   Physical Exam  Vitals and nursing note reviewed. Constitutional:       General: She is not in acute distress. Appearance: She is not ill-appearing. HENT:      Head: Normocephalic. Nose: Nose normal.      Mouth/Throat:      Lips: Pink. Pulmonary:      Effort: Pulmonary effort is normal. No respiratory distress.    Neurological: Mental Status: She is alert and oriented to person, place, and time. Psychiatric:         Attention and Perception: Attention normal.         Speech: Speech normal.         Behavior: Behavior is cooperative. An electronic signature was used to authenticate this note.     --Pau Martell, APRN - NP

## 2021-11-15 RX ORDER — PHENTERMINE HYDROCHLORIDE 37.5 MG/1
37.5 TABLET ORAL
Qty: 30 TABLET | Refills: 0 | Status: SHIPPED | OUTPATIENT
Start: 2021-11-15 | End: 2021-12-15

## 2021-11-15 ASSESSMENT — ENCOUNTER SYMPTOMS
SORE THROAT: 0
CONSTIPATION: 0
RHINORRHEA: 0
VOMITING: 0
COUGH: 0
SHORTNESS OF BREATH: 0
NAUSEA: 0
DIARRHEA: 0

## 2022-01-04 DIAGNOSIS — F41.9 ANXIETY: ICD-10-CM

## 2022-01-05 RX ORDER — FLUVOXAMINE MALEATE 25 MG
25 TABLET ORAL NIGHTLY
Qty: 90 TABLET | Refills: 1 | Status: SHIPPED | OUTPATIENT
Start: 2022-01-05 | End: 2023-01-05

## 2022-01-05 NOTE — TELEPHONE ENCOUNTER
Last visit: 11-5-21  Last Med refill: 11-16-21  Does patient have enough medication for 72 hours: Yes    Next Visit Date:  No future appointments.     Health Maintenance   Topic Date Due    DTaP/Tdap/Td vaccine (1 - Tdap) Never done    Breast cancer screen  Never done    Flu vaccine (1) 06/30/2022 (Originally 9/1/2021)    COVID-19 Vaccine (1) 08/13/2022 (Originally 4/14/1982)    Cervical cancer screen  09/10/2022 (Originally 4/24/2020)    Depression Screen  11/05/2022    Lipid screen  08/17/2026    HIV screen  Completed    Hepatitis A vaccine  Aged Out    Hepatitis B vaccine  Aged Out    Hib vaccine  Aged Out    Meningococcal (ACWY) vaccine  Aged Out    Pneumococcal 0-64 years Vaccine  Aged Out    Hepatitis C screen  Discontinued       Hemoglobin A1C (%)   Date Value   08/17/2021 5.3   02/16/2018 4.9             ( goal A1C is < 7)   No results found for: LABMICR  LDL Cholesterol (mg/dL)   Date Value   08/17/2021 94   02/16/2018 120       (goal LDL is <100)   AST (U/L)   Date Value   08/17/2021 15     ALT (U/L)   Date Value   08/17/2021 8     BUN (mg/dL)   Date Value   08/17/2021 11     BP Readings from Last 3 Encounters:   11/05/21 127/82   10/08/21 110/80   09/10/21 117/78          (goal 120/80)    All Future Testing planned in [facility]  Lab Frequency Next Occurrence   MRI ELBOW RIGHT WO CONTRAST Once 01/28/2021   Tdap (age 10y-63y) IM (Adacel) Once 09/13/2021   NEL YOHAN DIGITAL SCREEN BILATERAL Once 11/05/2021               Patient Active Problem List:     Neurologic cardiac syncope     Anxiety     Tobacco abuse     Elective procedure for unacceptable cosmetic appearance

## 2022-03-14 DIAGNOSIS — Z12.31 BREAST CANCER SCREENING BY MAMMOGRAM: ICD-10-CM

## 2023-03-09 ENCOUNTER — OFFICE VISIT (OUTPATIENT)
Dept: FAMILY MEDICINE CLINIC | Age: 48
End: 2023-03-09
Payer: COMMERCIAL

## 2023-03-09 VITALS
SYSTOLIC BLOOD PRESSURE: 124 MMHG | WEIGHT: 262 LBS | HEIGHT: 67 IN | TEMPERATURE: 97.4 F | DIASTOLIC BLOOD PRESSURE: 78 MMHG | OXYGEN SATURATION: 98 % | BODY MASS INDEX: 41.12 KG/M2 | HEART RATE: 71 BPM

## 2023-03-09 DIAGNOSIS — Z12.31 ENCOUNTER FOR SCREENING MAMMOGRAM FOR MALIGNANT NEOPLASM OF BREAST: ICD-10-CM

## 2023-03-09 DIAGNOSIS — L83 ACANTHOSIS NIGRICANS: ICD-10-CM

## 2023-03-09 DIAGNOSIS — Z13.220 LIPID SCREENING: ICD-10-CM

## 2023-03-09 DIAGNOSIS — L85.3 DRY SKIN: ICD-10-CM

## 2023-03-09 DIAGNOSIS — R53.83 LETHARGIC: ICD-10-CM

## 2023-03-09 DIAGNOSIS — R63.5 WEIGHT GAIN: ICD-10-CM

## 2023-03-09 DIAGNOSIS — L65.9 HAIR LOSS: ICD-10-CM

## 2023-03-09 DIAGNOSIS — E55.9 VITAMIN D DEFICIENCY: ICD-10-CM

## 2023-03-09 DIAGNOSIS — Z00.00 ENCOUNTER FOR ANNUAL PHYSICAL EXAM: Primary | ICD-10-CM

## 2023-03-09 PROCEDURE — 99396 PREV VISIT EST AGE 40-64: CPT | Performed by: NURSE PRACTITIONER

## 2023-03-09 PROCEDURE — G8484 FLU IMMUNIZE NO ADMIN: HCPCS | Performed by: NURSE PRACTITIONER

## 2023-03-09 RX ORDER — MAGNESIUM GLUCONATE 30 MG(550)
TABLET ORAL
COMMUNITY

## 2023-03-09 SDOH — ECONOMIC STABILITY: INCOME INSECURITY: HOW HARD IS IT FOR YOU TO PAY FOR THE VERY BASICS LIKE FOOD, HOUSING, MEDICAL CARE, AND HEATING?: NOT HARD AT ALL

## 2023-03-09 SDOH — ECONOMIC STABILITY: HOUSING INSECURITY
IN THE LAST 12 MONTHS, WAS THERE A TIME WHEN YOU DID NOT HAVE A STEADY PLACE TO SLEEP OR SLEPT IN A SHELTER (INCLUDING NOW)?: NO

## 2023-03-09 SDOH — ECONOMIC STABILITY: FOOD INSECURITY: WITHIN THE PAST 12 MONTHS, THE FOOD YOU BOUGHT JUST DIDN'T LAST AND YOU DIDN'T HAVE MONEY TO GET MORE.: NEVER TRUE

## 2023-03-09 SDOH — ECONOMIC STABILITY: FOOD INSECURITY: WITHIN THE PAST 12 MONTHS, YOU WORRIED THAT YOUR FOOD WOULD RUN OUT BEFORE YOU GOT MONEY TO BUY MORE.: NEVER TRUE

## 2023-03-09 ASSESSMENT — PATIENT HEALTH QUESTIONNAIRE - PHQ9
SUM OF ALL RESPONSES TO PHQ QUESTIONS 1-9: 0
SUM OF ALL RESPONSES TO PHQ9 QUESTIONS 1 & 2: 0
1. LITTLE INTEREST OR PLEASURE IN DOING THINGS: 0
2. FEELING DOWN, DEPRESSED OR HOPELESS: 0
SUM OF ALL RESPONSES TO PHQ QUESTIONS 1-9: 0

## 2023-03-09 NOTE — PROGRESS NOTES
Enrique Levin (:  1975) is a 41 y.o. female,Established patient, here for evaluation of the following chief complaint(s):  No chief complaint on file. ASSESSMENT/PLAN:  1. Encounter for annual physical exam  -     CBC; Future  -     Comprehensive Metabolic Panel; Future  2. Weight gain  -     TSH; Future  -     T4, Free; Future  -     Thyroid Antibodies; Future  -     ACTH; Future  3. Dry skin  -     TSH; Future  -     T4, Free; Future  -     Thyroid Antibodies; Future  4. Lethargic  -     TSH; Future  -     T4, Free; Future  -     Thyroid Antibodies; Future  5. Acanthosis nigricans  -     TSH; Future  -     T4, Free; Future  -     Thyroid Antibodies; Future  -     Hemoglobin A1C; Future  -     Insulin, Total; Future  6. Hair loss  -     TSH; Future  -     T4, Free; Future  -     Thyroid Antibodies; Future  -     Hemoglobin A1C; Future  7. Vitamin D deficiency  -     Vitamin D 25 Hydroxy; Future  8. Encounter for screening mammogram for malignant neoplasm of breast  -     NEL DIGITAL SCREEN W OR WO CAD BILATERAL; Future  9. Lipid screening  -     Lipid, Fasting; Future    Return if symptoms worsen or fail to improve. Subjective   SUBJECTIVE/OBJECTIVE:  Annual physical  Since November she has been experiencing random edema in legs and arms. First time it happened they flew to [facility] criss. She thought it was from that. She almost had to go to ER to get rings cut off. They were causing her pain. Her weight is also bothering her no matter what she does. Reports a normal bowel and bladder pattern. Sleeping ok. Mood stable. Review of Systems   Constitutional:  Negative for activity change, appetite change, fatigue and fever. HENT:  Negative for congestion, rhinorrhea and sore throat. Eyes:  Negative for visual disturbance. Respiratory:  Negative for cough and shortness of breath. Cardiovascular:  Positive for leg swelling. Negative for chest pain and palpitations. Subjective:    The patient location is: [place], patient's home.  The chief complaint leading to consultation is:      Visit type: audiovisual     Face to Face time with patient: 20  minutes of total time spent on the encounter, which includes face to face time and non-face to face time preparing to see the patient (eg, review of tests), Obtaining and/or reviewing separately obtained history, Documenting clinical information in the electronic or other health record, Independently interpreting results (not separately reported) and communicating results to the patient/family/caregiver, or Care coordination (not separately reported).      Each patient to whom he or she provides medical services by telemedicine is:  (1) informed of the relationship between the physician and patient and the respective role of any other health care provider with respect to management of the patient; and (2) notified that he or she may decline to receive medical services by telemedicine and may withdraw from such care at any time.        Patient ID: Luis Eduardo Matthew is a 9 y.o. male with LGS, ADHD, here for fu ADHD med.    Last visit 6 weeks ago starting on Tenex for behavior.   Titrated up to 0.5 mg BID.  Doing well on this dose.  Mom notices improvements in some of his impulsivity.  Has also improved his sleep routine.  No SE.  Seizures the same- 1-2 per week.  Mom wants to increase Tenex if possible.      Prior note:  Seizures well controlled with his baseline being 1-2 per week, short, abort with magnet.  In school, 2nd grade-[employer], same school.  Behavior is a problem, he is impulsive and hyperactive, sometimes difficult for teachers to control him as he is very strong.  Has never been on meds for behavior or ADHD in the past.    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, IVS 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, encodes adult folate receptor alpha which mediates delivery of -MTHF to the interior of cells, AR disorder that results in lower CSF levels of folic acid causing infantile seizures, leukodystrophy, regression, apnea, hypotonia; d/w genetics, since CSF neurotransmitters were normal, not clinically important; occurs at [facility] at 5' UTR with unclear results); [facility] comprehensive epilepsy panel - CNTNAP2 heterozygous VUS (c1786 G>A, [id number], incomplete penetrance, typically AR but some heterozygotes with sx, with intellectual disability, autism, epilepsy, neuropsych sx; likely impacts protein structure, but unclear impact on protein function; clinical variability among family members)   EEG 4/15/2015@[facility] - multi-focal spike wave discharges sometimes with electrical decrement and sometimes with generalized bursts c/w hypsarrhythmia   EKG 1/14/2015@[facility]- sinus bradycardia; possible left ventricular hypertrophy; ST elevation, consider early repolarizaton, pericarditis or injury   VEEG 4/20-4/21/2015@[facility] - hypsarrhythmia with 1 episode of spasms assoc with electrodecrement   EEG 5/5/2015@[facility] - much improved, rare spikes/sharps in the background sometimes associated with slow waves but overall normal background   CSF and serum AAs, pyruvate, CGH, biotinidase, VLCFAs, MPS spot and TLC - normal   Acylcarnitine panel, unclear significance - repeat with carnitine panel   EEG 9/18/2015@[facility] - multiple generalized bursts as well as multi-focal spike wave discharges during sleep with no clinical correlation   EEG 5/25/2016@[facility] -  multi-focal sand generalized spike wave discharges, no captured events, seems more abnormal that prior EEGs, head bobbing episodes ?infantile spasm variant   VEEG 6/14-6/15/2016@[facility] - multi-focal spike and polyspike and slow waves worse during sleep, no EEG correlatin with head bobs, no marked events, no hypsarrhythmia, and improved after 2 doses of Ativan   VEEG 1/26-1/27/2017@[facility] - normal background during wake state, during sleep multifocal SW discharges evolving to burst suppression, marked events unclear since off smiley, improved compared to prior EEGs   VEEG 3/8-3/9/2018@[facility] - during sleep > wake multifocal high amplitude spike wave bursts and runs, marked events unclear since no noted movement on video and no assocated EEG changes   [employer] Boosted Exome, Trio - No variants that fully explain the patient's reported phenotype were identified. Secondary findings negative for child and parents   Post seizure activity 06/2018 & 07/2018     Past Surgical History:   Procedure Laterality Date    CORPUS COLLOSUM TRANSECTION 07/2018    VAGUS NERVE STIMULATOR INSERTION     Social History     Socioeconomic History    Marital status: Single   Spouse name: None    Number of children: None    Years of education: None    Highest education level: None   Occupational History    None   Social Needs    Financial resource strain: None    Food insecurity:   Worry: None   Inability: None    Transportation needs:   Medical: None   Non-medical: None   Tobacco Use    Smoking status: Never Smoker   Substance and Sexual Activity    Alcohol use: No    Drug use: No    Sexual activity: None   Lifestyle    Physical activity:   Days per week: None   Minutes per session: None    Stress: None   Relationships    Social connections:   Talks on phone: None   Gets together: None   Attends Rastafarian service: None   Active member of club or organization: None   Attends meetings of clubs or organizations: None    Relationship status: None    Intimate partner violence:   Fear of current or ex partner: None   Emotionally abused: None   Physically abused: None   Forced sexual activity: None   Other Topics Concern    None   Social History Narrative    None     No Known Allergies    Hospitalization/Major Diagnostic Procedure   Seizures---VEEG admit and work-up at [facility] 1/2015   [facility] 6/2016          The following portions of the patient's history were reviewed and updated as appropriate: allergies, current medications, past family history, past medical history, past social history, past surgical history and problem list.    Objective:   Review of Systems   Neurological: Positive for seizures. Negative for dizziness, vertigo, tremors, facial asymmetry, weakness, numbness and memory loss.          Physical Exam  Constitutional:       Comments: Telemed, at baseline, non verbal.                Medication List with Changes/Refills   Current Medications    CLOBAZAM (ONFI) 2.5 MG/ML SUSP    Take 5 mLs (12.5 mg total) by mouth 2 (two) times daily.    EPIDIOLEX 100 MG/ML    Take 4 mLs (400 mg total) by mouth 2 (two) times daily.    GUANFACINE (TENEX) 1 MG TAB    Take 0.5 tablets (0.5 mg total) by mouth every evening for 7 days, THEN 0.5 tablets (0.5 mg total) 2 (two) times daily.    LEVETIRACETAM (KEPPRA) 100 MG/ML SOLN    Take 12.5 mLs (1,250 mg total) by mouth 2 (two) times daily.    VIMPAT 10 MG/ML SOLN ORAL SOLUTION    Take 6 mLs (60 mg total) by mouth 2 (two) times a day. 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  Started on Tenex, some improvements.   Seizures well controlled on current AED regimen.      Plan:     Cont Epidiolex 4 mls BID  Cont Onfi 5 mls BID  Cont Vimpat 60 mg BID  Cont Keppra 12.5 mls BID    Increase Tenex 0.5 mg Am, 1 mg PM, can further increase to 1/1 if tolerated.  Reviewed  seizure precautions and first aid  Discussed AED side effects.    Educated seizure precautions and first aid including no driving; no swimming or bathing without direct supervision; wear a helmet with biking, skating, or other activities; no dangerous activities such as heights, hot oils, etc. In case of a seizure, turn patient on their side, clear the area around their head, do not place anything in their mouth, do not restrain,use rescue medications as directed, call 911 if seizure persists more than 3-5 minutes or is associated with apnea, cyanosis, or other concerns.      Neurostimulator Program/Reprogram:   Battery, 25 to 50 %  Generator: Model: 106; placed 3/3/2017 by Dr Cruz at [facility].  Output current: 1.75.   Signal frequency: 20.   Pulse width: 250.   Signal On time: 20.   Signal Off time: 5.   Magnet output current: 2.25  Magnet On time: 60.   Magnet pulse width: 250  Diagnostics: OK.   Communication: OK.   Output status: OK.   Output current: OK.   Lead impedance: OK.   DC-DC convertor: no.   Near end-of-service: 3.   AutoStim: Detection: 20%.   AutoStim: Sensitivity: 3.   AutoStim: Current: 1.875  Average Autostims: 137    Fu 4-6 months    Reviewed when to RTC or report to ER for declining neurological status.      TIME SPENT IN ENCOUNTER : I spent 40 minutes face to face with the patient and family; > 50% was spent counseling them regarding findings from the available records including test/study results and their meaning, the diagnosis/differential diagnosis, diagnostic/treatment recommendations, therapeutic options, risks and benefits of management options, prognosis, plan/ instructions for management/use of medications, education, compliance and risk-factor reduction as well as in coordination of care and follow up plans.      Elen Orellana DNP, APRN, FNP-C  Pediatric Neurology Nurse Practitioner  Instructor of Pediatric Neurology

## 2023-03-13 ENCOUNTER — HOSPITAL ENCOUNTER (OUTPATIENT)
Age: 48
Setting detail: SPECIMEN
Discharge: HOME | End: 2023-03-13

## 2023-03-13 DIAGNOSIS — Z00.00 ENCOUNTER FOR ANNUAL PHYSICAL EXAM: ICD-10-CM

## 2023-03-13 DIAGNOSIS — R63.5 WEIGHT GAIN: ICD-10-CM

## 2023-03-13 DIAGNOSIS — E55.9 VITAMIN D DEFICIENCY: ICD-10-CM

## 2023-03-13 DIAGNOSIS — Z13.220 LIPID SCREENING: ICD-10-CM

## 2023-03-13 DIAGNOSIS — L85.3 DRY SKIN: ICD-10-CM

## 2023-03-13 DIAGNOSIS — L83 ACANTHOSIS NIGRICANS: ICD-10-CM

## 2023-03-13 DIAGNOSIS — L65.9 HAIR LOSS: ICD-10-CM

## 2023-03-13 DIAGNOSIS — R53.83 LETHARGIC: ICD-10-CM

## 2023-03-13 LAB
25(OH)D3 SERPL-MCNC: 31.9 NG/ML
ALBUMIN SERPL-MCNC: 4 G/DL (ref 3.5–5.2)
ALBUMIN/GLOBULIN RATIO: 1.2 (ref 1–2.5)
ALP SERPL-CCNC: 45 U/L (ref 35–104)
ALT SERPL-CCNC: 9 U/L (ref 5–33)
ANION GAP SERPL CALCULATED.3IONS-SCNC: 12 MMOL/L (ref 9–17)
AST SERPL-CCNC: 21 U/L
BILIRUB SERPL-MCNC: 0.4 MG/DL (ref 0.3–1.2)
BUN SERPL-MCNC: 12 MG/DL (ref 6–20)
CALCIUM SERPL-MCNC: 9.1 MG/DL (ref 8.6–10.4)
CHLORIDE SERPL-SCNC: 99 MMOL/L (ref 98–107)
CHOLEST SERPL-MCNC: 176 MG/DL
CHOLESTEROL/HDL RATIO: 3
CO2 SERPL-SCNC: 25 MMOL/L (ref 20–31)
CREAT SERPL-MCNC: 0.61 MG/DL (ref 0.5–0.9)
GFR SERPL CREATININE-BSD FRML MDRD: >60 ML/MIN/1.73M2
GLUCOSE SERPL-MCNC: 81 MG/DL (ref 70–99)
HCT VFR BLD AUTO: 40.4 % (ref 36.3–47.1)
HDLC SERPL-MCNC: 59 MG/DL
HGB BLD-MCNC: 13 G/DL (ref 11.9–15.1)
INSULIN COMMENT: NORMAL
INSULIN REFERENCE RANGE:: NORMAL
INSULIN: 9 MU/L
LDLC SERPL CALC-MCNC: 105 MG/DL (ref 0–130)
MCH RBC QN AUTO: 30.6 PG (ref 25.2–33.5)
MCHC RBC AUTO-ENTMCNC: 32.2 G/DL (ref 28.4–34.8)
MCV RBC AUTO: 95.1 FL (ref 82.6–102.9)
NRBC AUTOMATED: 0 PER 100 WBC
PDW BLD-RTO: 12.4 % (ref 11.8–14.4)
PLATELET # BLD AUTO: 346 K/UL (ref 138–453)
PMV BLD AUTO: 10.6 FL (ref 8.1–13.5)
POTASSIUM SERPL-SCNC: 4 MMOL/L (ref 3.7–5.3)
PROT SERPL-MCNC: 7.3 G/DL (ref 6.4–8.3)
RBC # BLD: 4.25 M/UL (ref 3.95–5.11)
SODIUM SERPL-SCNC: 136 MMOL/L (ref 135–144)
T4 FREE SERPL-MCNC: 1.02 NG/DL (ref 0.93–1.7)
TRIGL SERPL-MCNC: 58 MG/DL
TSH SERPL-ACNC: 1.92 UIU/ML (ref 0.3–5)
WBC # BLD AUTO: 6.4 K/UL (ref 3.5–11.3)

## 2023-03-14 LAB
EST. AVERAGE GLUCOSE BLD GHB EST-MCNC: 97 MG/DL
HBA1C MFR BLD: 5 % (ref 4–6)
THYROGLOBULIN AB: 14 IU/ML (ref 0–40)
THYROPEROXIDASE AB SERPL IA-ACNC: <4 IU/ML (ref 0–25)

## 2023-03-15 ENCOUNTER — HOSPITAL ENCOUNTER (OUTPATIENT)
Age: 48
Discharge: HOME | End: 2023-03-15
Payer: COMMERCIAL

## 2023-03-15 DIAGNOSIS — R63.5 WEIGHT GAIN: ICD-10-CM

## 2023-03-15 PROCEDURE — 82024 ASSAY OF ACTH: CPT

## 2023-03-15 PROCEDURE — 36415 COLL VENOUS BLD VENIPUNCTURE: CPT

## 2023-03-16 LAB — ACTH PLAS-MCNC: <5 PG/ML (ref 6–58)

## 2023-03-17 ENCOUNTER — PATIENT MESSAGE (OUTPATIENT)
Dept: FAMILY MEDICINE CLINIC | Age: 48
End: 2023-03-17

## 2023-03-17 DIAGNOSIS — E55.9 VITAMIN D DEFICIENCY: Primary | ICD-10-CM

## 2023-03-17 DIAGNOSIS — F41.9 ANXIETY: ICD-10-CM

## 2023-03-17 DIAGNOSIS — R79.89 LOW SERUM ADRENOCORTICOTROPHIC HORMONE (ACTH): Primary | ICD-10-CM

## 2023-03-17 DIAGNOSIS — R63.5 WEIGHT GAIN: ICD-10-CM

## 2023-03-17 DIAGNOSIS — R53.83 LETHARGIC: ICD-10-CM

## 2023-03-17 RX ORDER — ERGOCALCIFEROL 1.25 MG/1
50000 CAPSULE ORAL WEEKLY
Qty: 12 CAPSULE | Refills: 1 | Status: SHIPPED | OUTPATIENT
Start: 2023-03-17

## 2023-03-17 RX ORDER — DEXAMETHASONE 1 MG
1 TABLET ORAL ONCE
Qty: 1 TABLET | Refills: 0 | Status: SHIPPED | OUTPATIENT
Start: 2023-03-17 | End: 2023-03-17

## 2023-03-17 NOTE — TELEPHONE ENCOUNTER
From: Daniel Degroot  To:  Karrie Kawasaki  Sent: 3/17/2023 6:29 AM EDT  Subject: Lab work    As you asked, I'm reminding you to order the 2nd round of lab work

## 2023-03-20 ENCOUNTER — HOSPITAL ENCOUNTER (OUTPATIENT)
Age: 48
Setting detail: SPECIMEN
Discharge: HOME | End: 2023-03-20

## 2023-03-20 DIAGNOSIS — R63.5 WEIGHT GAIN: ICD-10-CM

## 2023-03-20 DIAGNOSIS — R53.83 LETHARGIC: ICD-10-CM

## 2023-03-20 DIAGNOSIS — F41.9 ANXIETY: ICD-10-CM

## 2023-03-20 DIAGNOSIS — R79.89 LOW SERUM ADRENOCORTICOTROPHIC HORMONE (ACTH): ICD-10-CM

## 2023-03-20 LAB
CORTISOL COLLECTION INFO: ABNORMAL
CORTISOL: 0.9 UG/DL (ref 2.7–18.4)

## 2023-03-24 LAB — DEXAMETHASONE: 245.1 NG/DL

## 2023-03-26 ASSESSMENT — ENCOUNTER SYMPTOMS
RHINORRHEA: 0
VOMITING: 0
COUGH: 0
DIARRHEA: 0
SHORTNESS OF BREATH: 0
NAUSEA: 0
CONSTIPATION: 0
SORE THROAT: 0

## 2023-04-08 ENCOUNTER — HOSPITAL ENCOUNTER (OUTPATIENT)
Dept: CT IMAGING | Age: 48
End: 2023-04-08
Payer: COMMERCIAL

## 2023-04-08 DIAGNOSIS — R79.89 LOW SERUM ADRENOCORTICOTROPHIC HORMONE (ACTH): ICD-10-CM

## 2023-04-08 PROCEDURE — 74150 CT ABDOMEN W/O CONTRAST: CPT

## 2023-04-13 ENCOUNTER — PATIENT MESSAGE (OUTPATIENT)
Dept: FAMILY MEDICINE CLINIC | Age: 48
End: 2023-04-13

## 2023-04-13 DIAGNOSIS — E66.01 CLASS 3 SEVERE OBESITY DUE TO EXCESS CALORIES WITHOUT SERIOUS COMORBIDITY IN ADULT, UNSPECIFIED BMI (HCC): Primary | ICD-10-CM

## 2023-05-09 ENCOUNTER — OFFICE VISIT (OUTPATIENT)
Dept: PRIMARY CARE CLINIC | Age: 48
End: 2023-05-09
Payer: COMMERCIAL

## 2023-05-09 VITALS
HEART RATE: 73 BPM | SYSTOLIC BLOOD PRESSURE: 124 MMHG | WEIGHT: 263 LBS | DIASTOLIC BLOOD PRESSURE: 86 MMHG | BODY MASS INDEX: 41.19 KG/M2 | OXYGEN SATURATION: 98 % | TEMPERATURE: 98.1 F

## 2023-05-09 DIAGNOSIS — R05.2 SUBACUTE COUGH: Primary | ICD-10-CM

## 2023-05-09 PROCEDURE — G8417 CALC BMI ABV UP PARAM F/U: HCPCS | Performed by: FAMILY MEDICINE

## 2023-05-09 PROCEDURE — 1036F TOBACCO NON-USER: CPT | Performed by: FAMILY MEDICINE

## 2023-05-09 PROCEDURE — G8427 DOCREV CUR MEDS BY ELIG CLIN: HCPCS | Performed by: FAMILY MEDICINE

## 2023-05-09 PROCEDURE — 99214 OFFICE O/P EST MOD 30 MIN: CPT | Performed by: FAMILY MEDICINE

## 2023-05-09 RX ORDER — DOXYCYCLINE HYCLATE 100 MG
100 TABLET ORAL 2 TIMES DAILY
Qty: 20 TABLET | Refills: 0 | Status: SHIPPED | OUTPATIENT
Start: 2023-05-09 | End: 2023-05-19

## 2023-05-09 NOTE — PROGRESS NOTES
Subjective: [place] presents for   Chief Complaint   Patient presents with    Pharyngitis     sick x 7 days Was seen on 5/3 tested for Strep and it was negative. Headache, sore throat, ear aches, cough, congestion. Taking dayquil/nyquil/ibuprofen/cough drops    Cough    Headache    Nasal Congestion   Started with a sore throat last week    Cough is nonproductive    Can breath thru her nose    No fevers     Had some chills initially    Otc helps a little    No gi sx  Patient Active Problem List   Diagnosis    Neurologic cardiac syncope    Anxiety    Tobacco abuse    Elective procedure for unacceptable cosmetic appearance       s. Objective:  Physical Exam   Vitals: Wt Readings from Last 3 Encounters:   05/09/23 263 lb (119.3 kg)   03/09/23 262 lb (118.8 kg)   11/05/21 243 lb (110.2 kg)     Ht Readings from Last 3 Encounters:   03/09/23 5' 7\" (1.702 m)   11/05/21 5' 7\" (1.702 m)   10/08/21 5' 7\" (1.702 m)     Body mass index is 41.19 kg/m². Vitals:    05/09/23 1653   BP: 124/86   Site: Left Upper Arm   Position: Sitting   Cuff Size: Medium Adult   Pulse: 73   Temp: 98.1 °F (36.7 °C)   SpO2: 98%   Weight: 263 lb (119.3 kg)       Constitutional: She is oriented to person, place, and time. She appears well-developed and well-nourished and in no acute distress. Answers all my questions appropriately. Head: Normocephalic and atraumatic. Eyes:conjunctiva appear normal.    Right Ear: External ear normal. TM is clear  Left Ear: External ear normal. TM is clear    Nose: pink, non-edematous mucosa. No polyps. No septal deviation    Throat: no erythema, tonsillar hypertrophy or exudate. No ulcerations noted. Lips/Teeth/Gums all appear normal.    Neck: Normal range of motion. Neck supple. No tracheal deviation present. At the base of the left ant neck she has a 1.5cm solitary node that is slightly tender    No JVD noted. Carotids are clear bilaterally. No thyroid masses noted. Heart: RRR without murmur.

## 2023-05-12 RX ORDER — PHENTERMINE HYDROCHLORIDE 37.5 MG/1
37.5 TABLET ORAL
Qty: 30 TABLET | Refills: 0 | Status: SHIPPED | OUTPATIENT
Start: 2023-05-12 | End: 2023-06-11

## 2023-12-27 ENCOUNTER — OFFICE VISIT (OUTPATIENT)
Dept: FAMILY MEDICINE CLINIC | Age: 48
End: 2023-12-27
Payer: COMMERCIAL

## 2023-12-27 VITALS
TEMPERATURE: 97.5 F | OXYGEN SATURATION: 98 % | HEIGHT: 67 IN | DIASTOLIC BLOOD PRESSURE: 68 MMHG | WEIGHT: 254 LBS | BODY MASS INDEX: 39.87 KG/M2 | HEART RATE: 68 BPM | SYSTOLIC BLOOD PRESSURE: 124 MMHG

## 2023-12-27 DIAGNOSIS — Z76.89 ENCOUNTER TO ESTABLISH CARE: Primary | ICD-10-CM

## 2023-12-27 DIAGNOSIS — B07.9 WART OF HAND: ICD-10-CM

## 2023-12-27 DIAGNOSIS — Z12.31 ENCOUNTER FOR SCREENING MAMMOGRAM FOR MALIGNANT NEOPLASM OF BREAST: ICD-10-CM

## 2023-12-27 DIAGNOSIS — R63.5 WEIGHT GAIN: ICD-10-CM

## 2023-12-27 DIAGNOSIS — R53.83 LETHARGIC: ICD-10-CM

## 2023-12-27 DIAGNOSIS — Z13.220 LIPID SCREENING: ICD-10-CM

## 2023-12-27 DIAGNOSIS — E55.9 VITAMIN D DEFICIENCY: ICD-10-CM

## 2023-12-27 PROCEDURE — 17110 DESTRUCTION B9 LES UP TO 14: CPT | Performed by: REGISTERED NURSE

## 2023-12-27 PROCEDURE — 99214 OFFICE O/P EST MOD 30 MIN: CPT | Performed by: REGISTERED NURSE

## 2023-12-27 NOTE — PATIENT INSTRUCTIONS
Liquid Nitrogen - \"freeze\" (Cryotherapy)  Your doctor has treated your skin lesions with a very cold substance. The liquid nitrogen is so cold that it may feel like the skin is burning during application. A clear blister or blood blister may form after treatment and may later form a scab. Leave the area alone. Usually this scab will fall of within 1-2 weeks. The area should be kept clean and can be covered with Vaseline and a Band-Aid if needed. If a large blister develops it is ok to use a clean needle to gently pop the blister.

## 2023-12-27 NOTE — PROGRESS NOTES
[facility]  [facility] [address] [address]  Dept: [phone]    CHIEF COMPLAINT:      Chief Complaint   Patient presents with    Skin Problem     Index finger on left hand, growth on the inner side of finger, causing pain and has grown over the past few days, patient states she has had it for 4-6 months now       HPI      Kristine Benitez is a 57 y.o. female who presents to establish care with new provider, she is previously followed by Jenise Lundberg NP. Her past medical history as noted below. She is , she has 2 children, she is employed as a dental assistant. She denies tobacco (quit approximately 7 years ago), social alcohol, denies recreational drug use. She is here to talk about her weight, fatigue, and that she has a wart on her left hand. Denies seeing any specialists. Needs to establish with Ob Gyn    ESTABLISHING CARE   Her chronic medical conditions are noted below:  Patient Active Problem List:     Neurologic cardiac syncope     Anxiety     Tobacco abuse     Elective procedure for unacceptable cosmetic appearance     ANXIETY/DEPRESSION Screening. PHQ-9 Total Score: 0 (12/27/2023  2:10 PM)     PHQ-9 Total Score: 0 (12/27/2023  2:10 PM)         12/27/2023     2:10 PM 5/31/2023    12:21 PM   TWILA 7 SCORE   TWILA-7 Total Score 0 2     Interpretation of TWILA-7 score: 5-9 = mild anxiety, 10-14 = moderate anxiety, 15+ = severe anxiety. Recommend referral to behavioral health for scores 10 or greater. ASSESSMENT & PLAN     1. Encounter to establish care  2. Wart of hand  -     22341 - ME DESTRUC PREMALIGNANT, FIRST LESION  3. Encounter for screening mammogram for malignant neoplasm of breast  -     NEL DIGITAL SCREEN W OR WO CAD BILATERAL; Future  4. Weight gain  -     TSH; Future  -     T4, Free; Future  -     T3, Free; Future  -     Thyroid Antibodies; Future  -     Hemoglobin A1C; Future  -     Glucose, Fasting;  Future  -     ACTH;

## 2023-12-28 ENCOUNTER — HOSPITAL ENCOUNTER (OUTPATIENT)
Age: 48
Setting detail: SPECIMEN
Discharge: HOME | End: 2023-12-28

## 2023-12-28 DIAGNOSIS — R63.5 WEIGHT GAIN: ICD-10-CM

## 2023-12-28 DIAGNOSIS — Z13.220 LIPID SCREENING: ICD-10-CM

## 2023-12-28 DIAGNOSIS — R53.83 LETHARGIC: ICD-10-CM

## 2023-12-28 DIAGNOSIS — E55.9 VITAMIN D DEFICIENCY: ICD-10-CM

## 2023-12-28 LAB
25(OH)D3 SERPL-MCNC: 32.3 NG/ML
CHOLEST SERPL-MCNC: 188 MG/DL
CHOLESTEROL/HDL RATIO: 3
EST. AVERAGE GLUCOSE BLD GHB EST-MCNC: 97 MG/DL
GLUCOSE P FAST SERPL-MCNC: 77 MG/DL (ref 70–99)
HBA1C MFR BLD: 5 % (ref 4–6)
HDLC SERPL-MCNC: 63 MG/DL
INSULIN COMMENT: NORMAL
INSULIN REFERENCE RANGE:: NORMAL
INSULIN: 7.6 MU/L
LDLC SERPL CALC-MCNC: 114 MG/DL (ref 0–130)
T3FREE SERPL-MCNC: 2.71 PG/ML (ref 2.02–4.43)
T4 FREE SERPL-MCNC: 1 NG/DL (ref 0.9–1.7)
TRIGL SERPL-MCNC: 54 MG/DL
TSH SERPL DL<=0.05 MIU/L-ACNC: 1.5 UIU/ML (ref 0.3–5)

## 2023-12-29 LAB
THYROGLOBULIN AB: <12 IU/ML (ref 0–40)
THYROPEROXIDASE AB SERPL IA-ACNC: <4 IU/ML (ref 0–25)

## 2024-04-10 ENCOUNTER — HOSPITAL ENCOUNTER (OUTPATIENT)
Age: 49
Setting detail: SPECIMEN
Discharge: HOME | End: 2024-04-10

## 2024-04-10 ENCOUNTER — OFFICE VISIT (OUTPATIENT)
Dept: OBGYN CLINIC | Age: 49
End: 2024-04-10
Payer: COMMERCIAL

## 2024-04-10 VITALS
BODY MASS INDEX: 40.49 KG/M2 | SYSTOLIC BLOOD PRESSURE: 114 MMHG | HEIGHT: 67 IN | DIASTOLIC BLOOD PRESSURE: 72 MMHG | WEIGHT: 258 LBS

## 2024-04-10 DIAGNOSIS — Z91.89 AT HIGH RISK FOR BREAST CANCER: ICD-10-CM

## 2024-04-10 DIAGNOSIS — N95.1 PERIMENOPAUSE: ICD-10-CM

## 2024-04-10 DIAGNOSIS — Z80.3 FAMILY HISTORY OF BREAST CANCER: ICD-10-CM

## 2024-04-10 DIAGNOSIS — N80.9 ENDOMETRIOSIS: ICD-10-CM

## 2024-04-10 DIAGNOSIS — Z01.419 WELL WOMAN EXAM: Primary | ICD-10-CM

## 2024-04-10 DIAGNOSIS — Z30.431 IUD CHECK UP: ICD-10-CM

## 2024-04-10 DIAGNOSIS — Z12.31 SCREENING MAMMOGRAM FOR BREAST CANCER: ICD-10-CM

## 2024-04-10 PROCEDURE — 99386 PREV VISIT NEW AGE 40-64: CPT | Performed by: ADVANCED PRACTICE MIDWIFE

## 2024-04-10 SDOH — ECONOMIC STABILITY: INCOME INSECURITY: HOW HARD IS IT FOR YOU TO PAY FOR THE VERY BASICS LIKE FOOD, HOUSING, MEDICAL CARE, AND HEATING?: NOT HARD AT ALL

## 2024-04-10 SDOH — ECONOMIC STABILITY: FOOD INSECURITY: WITHIN THE PAST 12 MONTHS, YOU WORRIED THAT YOUR FOOD WOULD RUN OUT BEFORE YOU GOT MONEY TO BUY MORE.: NEVER TRUE

## 2024-04-10 SDOH — ECONOMIC STABILITY: FOOD INSECURITY: WITHIN THE PAST 12 MONTHS, THE FOOD YOU BOUGHT JUST DIDN'T LAST AND YOU DIDN'T HAVE MONEY TO GET MORE.: NEVER TRUE

## 2024-04-10 ASSESSMENT — ENCOUNTER SYMPTOMS
ABDOMINAL PAIN: 0
SHORTNESS OF BREATH: 0
NAUSEA: 0
VOMITING: 0
DIARRHEA: 0

## 2024-04-10 ASSESSMENT — PATIENT HEALTH QUESTIONNAIRE - PHQ9
SUM OF ALL RESPONSES TO PHQ QUESTIONS 1-9: 0
1. LITTLE INTEREST OR PLEASURE IN DOING THINGS: NOT AT ALL
2. FEELING DOWN, DEPRESSED OR HOPELESS: NOT AT ALL
SUM OF ALL RESPONSES TO PHQ9 QUESTIONS 1 & 2: 0
SUM OF ALL RESPONSES TO PHQ QUESTIONS 1-9: 0

## 2024-04-10 NOTE — PROGRESS NOTES
Chaperone for Intimate Exam  Chaperone was offered as part of the rooming process. Patient declined and agrees to continue with exam without a chaperone.  Chaperone: n.a      
symptoms     3. IUD check up  - Reviewed liletta up to 8 years now. Reviewed risks/benefits at this time will proceed with expectant management, IUD strings visualized. IUD placed June 2017 will need removed by June 2025 at this time is planning for replacement with Mirena     4. Family history of breast cancer    - NEL DIGITAL SCREEN W OR WO CAD BILATERAL; Future  - MRI BREAST BILATERAL W WO CONTRAST; Future  - January Campbell, [facility], Genetics, [place]    5. At high risk for breast cancer  - NEL DIGITAL SCREEN W OR WO CAD BILATERAL; Future  - MRI BREAST BILATERAL W WO CONTRAST; Future  - January Campbell, [facility], [place]    6. Screening mammogram for breast cancer  - NEL DIGITAL SCREEN W OR WO CAD BILATERAL; Future  - MRI BREAST BILATERAL W WO CONTRAST; Future    7. Perimenopause  - Reviewed perimenopause and fluctuations in hormones. Discussed if struggling with anxiety/depression symptoms would recommend first trailing a SSRI/SNRI Dafne Wu will f/up with her PCP.    Return if symptoms worsen or fail to improve.     The patient, Dafne Wu , was seen with a total time spent of 25 minutes for the [facility]  The time component, involved both face-to-face (counseling and education)  and non face-to-face time (care coordination), spent in determining the total time component.    She was also counseled on her preventative health maintenance recommendations and follow-up.    Electronically Signed by REBECCA Vallecillo CNM

## 2024-04-12 LAB
HPV I/H RISK 4 DNA CVX QL NAA+PROBE: NOT DETECTED
HPV SAMPLE: NORMAL
HPV, INTERPRETATION: NORMAL
HPV16 DNA CVX QL NAA+PROBE: NOT DETECTED
HPV18 DNA CVX QL NAA+PROBE: NOT DETECTED
SPECIMEN DESCRIPTION: NORMAL

## 2024-04-19 LAB — CYTOLOGY REPORT: NORMAL

## 2024-07-03 ENCOUNTER — OFFICE VISIT (OUTPATIENT)
Dept: PRIMARY CARE CLINIC | Age: 49
End: 2024-07-03
Payer: COMMERCIAL

## 2024-07-03 VITALS
WEIGHT: 259 LBS | SYSTOLIC BLOOD PRESSURE: 98 MMHG | HEART RATE: 72 BPM | BODY MASS INDEX: 40.57 KG/M2 | DIASTOLIC BLOOD PRESSURE: 70 MMHG | TEMPERATURE: 97.8 F | OXYGEN SATURATION: 98 %

## 2024-07-03 DIAGNOSIS — H57.89 EYE SWELLING: Primary | ICD-10-CM

## 2024-07-03 DIAGNOSIS — R21 RASH: ICD-10-CM

## 2024-07-03 PROCEDURE — 99213 OFFICE O/P EST LOW 20 MIN: CPT | Performed by: PHYSICIAN ASSISTANT

## 2024-07-03 PROCEDURE — 1036F TOBACCO NON-USER: CPT | Performed by: PHYSICIAN ASSISTANT

## 2024-07-03 PROCEDURE — G8417 CALC BMI ABV UP PARAM F/U: HCPCS | Performed by: PHYSICIAN ASSISTANT

## 2024-07-03 PROCEDURE — G8427 DOCREV CUR MEDS BY ELIG CLIN: HCPCS | Performed by: PHYSICIAN ASSISTANT

## 2024-07-03 PROCEDURE — 96372 THER/PROPH/DIAG INJ SC/IM: CPT | Performed by: PHYSICIAN ASSISTANT

## 2024-07-03 RX ORDER — PREDNISONE 20 MG/1
20 TABLET ORAL 2 TIMES DAILY
Qty: 10 TABLET | Refills: 0 | Status: SHIPPED | OUTPATIENT
Start: 2024-07-04 | End: 2024-07-09

## 2024-07-03 RX ORDER — TRIAMCINOLONE ACETONIDE 40 MG/ML
40 INJECTION, SUSPENSION INTRA-ARTICULAR; INTRAMUSCULAR ONCE
Status: COMPLETED | OUTPATIENT
Start: 2024-07-03 | End: 2024-07-03

## 2024-07-03 RX ORDER — LEVONORGESTREL 52 MG/1
1 INTRAUTERINE DEVICE INTRAUTERINE ONCE
COMMUNITY

## 2024-07-03 RX ADMIN — TRIAMCINOLONE ACETONIDE 40 MG: 40 INJECTION, SUSPENSION INTRA-ARTICULAR; INTRAMUSCULAR at 09:38

## 2024-07-03 ASSESSMENT — ENCOUNTER SYMPTOMS
RESPIRATORY NEGATIVE: 1
FACIAL SWELLING: 1
GASTROINTESTINAL NEGATIVE: 1

## 2024-07-03 NOTE — PROGRESS NOTES
[facility] In  [address] [place] [address]  Phone: [phone]  Fax: [phone]       [facility] WALK - IN    Pt Name: Dafne Wu  MRN: [id number]  Birthdate 1975  Date of evaluation: 7/3/2024  Provider: Mikaela Troncoso PA-C     CHIEF COMPLAINT       Chief Complaint   Patient presents with    Eye Swelling     Right eye swelling started yesterday morning.            HISTORY OF PRESENT ILLNESS  (Location/Symptom, Timing/Onset, Context/Setting, Quality, Duration, Modifying Factors, Severity.)   Dafne Wu is a 49 y.o. White (non-) [1] female who presents to the office for evaluation of      Swelling  This is a new problem. The current episode started yesterday. The problem has been gradually worsening. Associated symptoms include a rash. Pertinent negatives include no chills, diaphoresis, fatigue or fever. Associated symptoms comments: Left eye swelling itching and vesicular lesions.       Nursing Notes were reviewed.    REVIEW OF SYSTEMS    (2-9 systems for level 4, 10 or more for level 5)     Review of Systems   Constitutional:  Negative for chills, diaphoresis, fatigue and fever.   HENT:  Positive for facial swelling.    Respiratory: Negative.     Cardiovascular: Negative.    Gastrointestinal: Negative.    Genitourinary: Negative.    Musculoskeletal: Negative.    Skin:  Positive for rash.         Except as noted above the remainder of the review of systems was reviewed andnegative.       PAST MEDICAL HISTORY   History reviewed.    Past Medical History:   Diagnosis Date    Anxiety     Neurologic cardiac syncope sept 2009         SURGICAL HISTORY     History reviewed.    Past Surgical History:   Procedure Laterality Date    BREAST ENHANCEMENT SURGERY Bilateral 03/29/2018    breast mammoplasty augmentation - Dr. Calvin    FRACTURE SURGERY      left fibula repair    FRACTURE SURGERY      ankle repair    INTRAUTERINE DEVICE INSERTION  07/2017    mirena    LAPAROSCOPY

## 2024-09-11 ENCOUNTER — OFFICE VISIT (OUTPATIENT)
Dept: FAMILY MEDICINE CLINIC | Age: 49
End: 2024-09-11
Payer: COMMERCIAL

## 2024-09-11 VITALS
TEMPERATURE: 98.5 F | HEIGHT: 67 IN | DIASTOLIC BLOOD PRESSURE: 70 MMHG | BODY MASS INDEX: 40.49 KG/M2 | HEART RATE: 71 BPM | SYSTOLIC BLOOD PRESSURE: 128 MMHG | WEIGHT: 258 LBS | OXYGEN SATURATION: 98 %

## 2024-09-11 DIAGNOSIS — G89.29 CHRONIC PAIN OF LEFT ANKLE: ICD-10-CM

## 2024-09-11 DIAGNOSIS — M25.572 CHRONIC PAIN OF LEFT ANKLE: ICD-10-CM

## 2024-09-11 DIAGNOSIS — R53.83 FATIGUE, UNSPECIFIED TYPE: ICD-10-CM

## 2024-09-11 DIAGNOSIS — F41.9 ANXIETY: Primary | ICD-10-CM

## 2024-09-11 DIAGNOSIS — G47.9 DIFFICULTY SLEEPING: ICD-10-CM

## 2024-09-11 DIAGNOSIS — E66.01 CLASS 3 SEVERE OBESITY DUE TO EXCESS CALORIES WITHOUT SERIOUS COMORBIDITY WITH BODY MASS INDEX (BMI) OF 40.0 TO 44.9 IN ADULT (HCC): ICD-10-CM

## 2024-09-11 PROCEDURE — 99215 OFFICE O/P EST HI 40 MIN: CPT | Performed by: REGISTERED NURSE

## 2024-09-11 RX ORDER — PAROXETINE 10 MG/1
10 TABLET, FILM COATED ORAL DAILY
Qty: 30 TABLET | Refills: 3 | Status: SHIPPED | OUTPATIENT
Start: 2024-09-11

## 2024-09-11 ASSESSMENT — ANXIETY QUESTIONNAIRES
7. FEELING AFRAID AS IF SOMETHING AWFUL MIGHT HAPPEN: NOT AT ALL
5. BEING SO RESTLESS THAT IT IS HARD TO SIT STILL: NEARLY EVERY DAY
2. NOT BEING ABLE TO STOP OR CONTROL WORRYING: NEARLY EVERY DAY
1. FEELING NERVOUS, ANXIOUS, OR ON EDGE: NEARLY EVERY DAY
GAD7 TOTAL SCORE: 18
IF YOU CHECKED OFF ANY PROBLEMS ON THIS QUESTIONNAIRE, HOW DIFFICULT HAVE THESE PROBLEMS MADE IT FOR YOU TO DO YOUR WORK, TAKE CARE OF THINGS AT HOME, OR GET ALONG WITH OTHER PEOPLE: VERY DIFFICULT
4. TROUBLE RELAXING: NEARLY EVERY DAY
6. BECOMING EASILY ANNOYED OR IRRITABLE: NEARLY EVERY DAY
3. WORRYING TOO MUCH ABOUT DIFFERENT THINGS: NEARLY EVERY DAY

## 2024-09-11 ASSESSMENT — PATIENT HEALTH QUESTIONNAIRE - PHQ9
1. LITTLE INTEREST OR PLEASURE IN DOING THINGS: NOT AT ALL
SUM OF ALL RESPONSES TO PHQ QUESTIONS 1-9: 0
DEPRESSION UNABLE TO ASSESS: URGENT/EMERGENT SITUATION
2. FEELING DOWN, DEPRESSED OR HOPELESS: NOT AT ALL
SUM OF ALL RESPONSES TO PHQ QUESTIONS 1-9: 0
SUM OF ALL RESPONSES TO PHQ QUESTIONS 1-9: 0
SUM OF ALL RESPONSES TO PHQ9 QUESTIONS 1 & 2: 0
SUM OF ALL RESPONSES TO PHQ QUESTIONS 1-9: 0

## 2024-10-10 DIAGNOSIS — G47.9 DIFFICULTY SLEEPING: ICD-10-CM

## 2024-10-10 DIAGNOSIS — R53.83 FATIGUE, UNSPECIFIED TYPE: Primary | ICD-10-CM

## 2024-10-10 DIAGNOSIS — R63.5 WEIGHT GAIN: ICD-10-CM

## 2024-10-30 ENCOUNTER — OFFICE VISIT (OUTPATIENT)
Dept: FAMILY MEDICINE CLINIC | Age: 49
End: 2024-10-30

## 2024-10-30 VITALS
BODY MASS INDEX: 40.97 KG/M2 | OXYGEN SATURATION: 97 % | SYSTOLIC BLOOD PRESSURE: 112 MMHG | TEMPERATURE: 98.7 F | HEART RATE: 68 BPM | DIASTOLIC BLOOD PRESSURE: 78 MMHG | HEIGHT: 67 IN | WEIGHT: 261 LBS

## 2024-10-30 DIAGNOSIS — Z12.11 SCREENING FOR COLON CANCER: Primary | ICD-10-CM

## 2024-10-30 DIAGNOSIS — F41.9 ANXIETY: ICD-10-CM

## 2024-10-30 DIAGNOSIS — E66.01 CLASS 3 SEVERE OBESITY WITHOUT SERIOUS COMORBIDITY WITH BODY MASS INDEX (BMI) OF 40.0 TO 44.9 IN ADULT, UNSPECIFIED OBESITY TYPE: ICD-10-CM

## 2024-10-30 DIAGNOSIS — E66.813 CLASS 3 SEVERE OBESITY WITHOUT SERIOUS COMORBIDITY WITH BODY MASS INDEX (BMI) OF 40.0 TO 44.9 IN ADULT, UNSPECIFIED OBESITY TYPE: ICD-10-CM

## 2024-10-30 RX ORDER — PAROXETINE 20 MG/1
20 TABLET, FILM COATED ORAL DAILY
Qty: 30 TABLET | Refills: 3 | Status: SHIPPED | OUTPATIENT
Start: 2024-10-30

## 2024-10-30 RX ORDER — PHENTERMINE HYDROCHLORIDE 37.5 MG/1
37.5 TABLET ORAL
Qty: 30 TABLET | Refills: 0 | Status: SHIPPED | OUTPATIENT
Start: 2024-10-30 | End: 2024-11-29

## 2024-10-30 ASSESSMENT — PATIENT HEALTH QUESTIONNAIRE - PHQ9
SUM OF ALL RESPONSES TO PHQ QUESTIONS 1-9: 0
1. LITTLE INTEREST OR PLEASURE IN DOING THINGS: NOT AT ALL
2. FEELING DOWN, DEPRESSED OR HOPELESS: NOT AT ALL
SUM OF ALL RESPONSES TO PHQ QUESTIONS 1-9: 0
SUM OF ALL RESPONSES TO PHQ9 QUESTIONS 1 & 2: 0
SUM OF ALL RESPONSES TO PHQ QUESTIONS 1-9: 0
SUM OF ALL RESPONSES TO PHQ QUESTIONS 1-9: 0

## 2024-10-30 ASSESSMENT — ANXIETY QUESTIONNAIRES
IF YOU CHECKED OFF ANY PROBLEMS ON THIS QUESTIONNAIRE, HOW DIFFICULT HAVE THESE PROBLEMS MADE IT FOR YOU TO DO YOUR WORK, TAKE CARE OF THINGS AT HOME, OR GET ALONG WITH OTHER PEOPLE: NOT DIFFICULT AT ALL
2. NOT BEING ABLE TO STOP OR CONTROL WORRYING: NEARLY EVERY DAY
5. BEING SO RESTLESS THAT IT IS HARD TO SIT STILL: NOT AT ALL
3. WORRYING TOO MUCH ABOUT DIFFERENT THINGS: MORE THAN HALF THE DAYS
1. FEELING NERVOUS, ANXIOUS, OR ON EDGE: NEARLY EVERY DAY
6. BECOMING EASILY ANNOYED OR IRRITABLE: SEVERAL DAYS
4. TROUBLE RELAXING: NEARLY EVERY DAY
GAD7 TOTAL SCORE: 12
7. FEELING AFRAID AS IF SOMETHING AWFUL MIGHT HAPPEN: NOT AT ALL

## 2024-10-30 ASSESSMENT — ENCOUNTER SYMPTOMS
SHORTNESS OF BREATH: 0
CONSTIPATION: 1

## 2024-10-30 NOTE — PROGRESS NOTES
Type 18      Result:   Not Detected      Ref Range: (Not  Detected)  Sample:  Other High Risk HPV      Result:   Not Detected      Ref  Range: (Not Detected)  Sample:  HPV Interp      Result:         Ref Range: (Not Detected)  This test amplifies and detects DNA of 14 high-risk HPV                           types  associated with cervical cancer and its precursor lesions   (HPV types 16,18, 31, 33, 35, 39, 45, 51, 52, 56, 58, 59, 66, and  68).        Sensitivity may be affected by specimen collection methods, stage of  infection, and the presence of interfering   substances. Results should be interpreted in conjunction with other  available laboratory and clinical data. A negative   high-risk HPV result does not exclude the possibility of future  cytologic HSIL or underlying CIN2-3 or cancer.        This test is intended for medical purposes only and is not valid for  the evaluation of suspected sexual abuse or for   other forensic purposes. Performed at [facility], [address] [place] [address]  [phone].               Source of Specimen:  A: Imaged ThinPrep Pap - Cervical (1 monolayer slide)    HPV Reflex?......................HPV Regardless      Clinical History  Intrauterine device  Z01.419 Routine gyn exam without abnormal findings    Processing Lab: [address] [id number]  Interpretation performed at [place] [id number]  This Pap Test has been evaluated with the assistance of the ThinPrep  Pap Test Imaging System.  The Pap smear is a screening test primarily for squamous epithelial  lesions, which is subject to both false negative and false positive  results. Your patient should be reminded to consult you immediately if  she experiences any suspicious signs or symptoms, regardless of her  Pap smear result.  GYNECOLOGIC CYTOLOGY REPORT    Patient Name: GWYN BARROSO

## 2024-11-06 ENCOUNTER — HOSPITAL ENCOUNTER (OUTPATIENT)
Dept: SLEEP CENTER | Age: 49
Discharge: HOME | End: 2024-11-08
Payer: COMMERCIAL

## 2024-11-06 DIAGNOSIS — R63.5 WEIGHT GAIN: ICD-10-CM

## 2024-11-06 DIAGNOSIS — G47.9 DIFFICULTY SLEEPING: ICD-10-CM

## 2024-11-06 DIAGNOSIS — R53.83 FATIGUE, UNSPECIFIED TYPE: ICD-10-CM

## 2024-11-06 PROCEDURE — G0399 HOME SLEEP TEST/TYPE 3 PORTA: HCPCS

## 2024-11-24 LAB — NONINV COLON CA DNA+OCC BLD SCRN STL QL: NEGATIVE

## 2024-11-27 ENCOUNTER — OFFICE VISIT (OUTPATIENT)
Dept: FAMILY MEDICINE CLINIC | Age: 49
End: 2024-11-27
Payer: COMMERCIAL

## 2024-11-27 VITALS
OXYGEN SATURATION: 98 % | DIASTOLIC BLOOD PRESSURE: 82 MMHG | BODY MASS INDEX: 39.78 KG/M2 | HEART RATE: 67 BPM | TEMPERATURE: 98.1 F | SYSTOLIC BLOOD PRESSURE: 122 MMHG | WEIGHT: 254 LBS

## 2024-11-27 DIAGNOSIS — E66.813 CLASS 3 SEVERE OBESITY WITHOUT SERIOUS COMORBIDITY WITH BODY MASS INDEX (BMI) OF 40.0 TO 44.9 IN ADULT, UNSPECIFIED OBESITY TYPE: ICD-10-CM

## 2024-11-27 DIAGNOSIS — E66.01 CLASS 3 SEVERE OBESITY WITHOUT SERIOUS COMORBIDITY WITH BODY MASS INDEX (BMI) OF 40.0 TO 44.9 IN ADULT, UNSPECIFIED OBESITY TYPE: ICD-10-CM

## 2024-11-27 PROCEDURE — G8427 DOCREV CUR MEDS BY ELIG CLIN: HCPCS | Performed by: REGISTERED NURSE

## 2024-11-27 PROCEDURE — G8484 FLU IMMUNIZE NO ADMIN: HCPCS | Performed by: REGISTERED NURSE

## 2024-11-27 PROCEDURE — 1036F TOBACCO NON-USER: CPT | Performed by: REGISTERED NURSE

## 2024-11-27 PROCEDURE — G8417 CALC BMI ABV UP PARAM F/U: HCPCS | Performed by: REGISTERED NURSE

## 2024-11-27 PROCEDURE — 99213 OFFICE O/P EST LOW 20 MIN: CPT | Performed by: REGISTERED NURSE

## 2024-11-27 RX ORDER — PHENTERMINE HYDROCHLORIDE 37.5 MG/1
37.5 TABLET ORAL
Qty: 30 TABLET | Refills: 0 | Status: SHIPPED | OUTPATIENT
Start: 2024-11-27 | End: 2024-12-27

## 2024-11-27 ASSESSMENT — ENCOUNTER SYMPTOMS
CONSTIPATION: 0
SHORTNESS OF BREATH: 0

## 2024-11-27 NOTE — PROGRESS NOTES
[facility] [address] [address]  Dept: [phone]    CHIEF COMPLAINT:      Chief Complaint   Patient presents with    Obesity     4 week f/u         ASSESSMENT & PLAN     1. Class 3 severe obesity without serious comorbidity with body mass index (BMI) of 40.0 to 44.9 in adult, unspecified obesity type  -     phentermine (ADIPEX-P) 37.5 MG tablet; Take 1 tablet by mouth every morning (before breakfast) for 30 days. Max Daily Amount: 37.5 mg, Disp-30 tablet, R-0Normal     Return in about 4 weeks (around 12/25/2024) for adipex.       SUBJECTIVE/OBJECTIVE   Dafne Wu is a 49 y.o. female who presents for follow up on weight loss.     HPI  Patient presents today for follow-up on weight loss utilizing Adipex.  Patient has had a weight loss.  Patient denies any constipation, swelling, anxiousness, palpitations, sweating.  Patient side effects do include dry mouth.  Starting weight was 261, current weight is 254. Goal for next office visit is 249 lb.  She continue to track and make smart choices.     Patient Care Team:  Tigre Orozco APRN - CNP as PCP - General (Family Medicine)  Tigre Orozco APRN - CNP as PCP - Empaneled Provider        Review of Systems   Respiratory:  Negative for shortness of breath.    Cardiovascular:  Negative for chest pain and palpitations.   Gastrointestinal:  Negative for constipation (discussed mirilax and fiber).   Neurological:  Negative for dizziness and headaches.   Psychiatric/Behavioral:  Positive for sleep disturbance (difficulty staying asleep). The patient is nervous/anxious.         PHYSICAL EXAM:      /82 (Site: Right Upper Arm, Position: Sitting, Cuff Size: Large Adult)   Pulse 67   Temp 98.1 °F (36.7 °C) (Tympanic)   Wt 115.2 kg (254 lb)   SpO2 98%   BMI 39.78 kg/m²     Physical Exam  Cardiovascular:      Rate and Rhythm: Normal rate and regular rhythm.      Pulses: Normal pulses.      Heart sounds:

## 2024-12-03 PROBLEM — R63.5 WEIGHT GAIN: Status: ACTIVE | Noted: 2024-12-03

## 2024-12-03 PROBLEM — G47.9 DIFFICULTY SLEEPING: Status: ACTIVE | Noted: 2024-12-03

## 2024-12-03 PROBLEM — R53.83 FATIGUE: Status: ACTIVE | Noted: 2024-12-03

## 2024-12-03 LAB — STATUS: NORMAL

## 2024-12-04 DIAGNOSIS — G47.33 OBSTRUCTIVE SLEEP APNEA: Primary | ICD-10-CM

## 2024-12-26 ENCOUNTER — OFFICE VISIT (OUTPATIENT)
Dept: FAMILY MEDICINE CLINIC | Age: 49
End: 2024-12-26
Payer: COMMERCIAL

## 2024-12-26 VITALS
OXYGEN SATURATION: 97 % | HEART RATE: 82 BPM | DIASTOLIC BLOOD PRESSURE: 76 MMHG | BODY MASS INDEX: 39 KG/M2 | WEIGHT: 249 LBS | SYSTOLIC BLOOD PRESSURE: 114 MMHG | TEMPERATURE: 97.6 F

## 2024-12-26 DIAGNOSIS — B00.1 COLD SORE: ICD-10-CM

## 2024-12-26 DIAGNOSIS — E66.813 CLASS 3 SEVERE OBESITY WITHOUT SERIOUS COMORBIDITY WITH BODY MASS INDEX (BMI) OF 40.0 TO 44.9 IN ADULT, UNSPECIFIED OBESITY TYPE: Primary | ICD-10-CM

## 2024-12-26 DIAGNOSIS — E66.01 CLASS 3 SEVERE OBESITY WITHOUT SERIOUS COMORBIDITY WITH BODY MASS INDEX (BMI) OF 40.0 TO 44.9 IN ADULT, UNSPECIFIED OBESITY TYPE: Primary | ICD-10-CM

## 2024-12-26 PROCEDURE — 99214 OFFICE O/P EST MOD 30 MIN: CPT | Performed by: REGISTERED NURSE

## 2024-12-26 PROCEDURE — G8484 FLU IMMUNIZE NO ADMIN: HCPCS | Performed by: REGISTERED NURSE

## 2024-12-26 PROCEDURE — 1036F TOBACCO NON-USER: CPT | Performed by: REGISTERED NURSE

## 2024-12-26 PROCEDURE — G8417 CALC BMI ABV UP PARAM F/U: HCPCS | Performed by: REGISTERED NURSE

## 2024-12-26 PROCEDURE — G8427 DOCREV CUR MEDS BY ELIG CLIN: HCPCS | Performed by: REGISTERED NURSE

## 2024-12-26 RX ORDER — VALACYCLOVIR HYDROCHLORIDE 1 G/1
1000 TABLET, FILM COATED ORAL DAILY
Qty: 3 TABLET | Refills: 6 | Status: SHIPPED | OUTPATIENT
Start: 2024-12-26 | End: 2025-12-26

## 2024-12-26 RX ORDER — PHENTERMINE HYDROCHLORIDE 37.5 MG/1
37.5 TABLET ORAL
Qty: 30 TABLET | Refills: 0 | Status: SHIPPED | OUTPATIENT
Start: 2024-12-26 | End: 2025-01-25

## 2024-12-26 ASSESSMENT — PATIENT HEALTH QUESTIONNAIRE - PHQ9
SUM OF ALL RESPONSES TO PHQ QUESTIONS 1-9: 0
2. FEELING DOWN, DEPRESSED OR HOPELESS: NOT AT ALL
SUM OF ALL RESPONSES TO PHQ9 QUESTIONS 1 & 2: 0
SUM OF ALL RESPONSES TO PHQ QUESTIONS 1-9: 0
1. LITTLE INTEREST OR PLEASURE IN DOING THINGS: NOT AT ALL
SUM OF ALL RESPONSES TO PHQ QUESTIONS 1-9: 0
SUM OF ALL RESPONSES TO PHQ QUESTIONS 1-9: 0

## 2024-12-26 ASSESSMENT — ENCOUNTER SYMPTOMS
CONSTIPATION: 0
SHORTNESS OF BREATH: 0

## 2024-12-26 NOTE — PROGRESS NOTES
[facility]  [facility] [address] [address]  Dept: [phone]    CHIEF COMPLAINT:      Chief Complaint   Patient presents with    Weight Management         ASSESSMENT & PLAN     1. Class 3 severe obesity without serious comorbidity with body mass index (BMI) of 40.0 to 44.9 in adult, unspecified obesity type  -     phentermine (ADIPEX-P) 37.5 MG tablet; Take 1 tablet by mouth every morning (before breakfast) for 30 days. Max Daily Amount: 37.5 mg, Disp-30 tablet, R-0Normal  2. Cold sore  -     valACYclovir (VALTREX) 1 g tablet; Take 1 tablet by mouth daily Start within 24 hours of sx, Disp-3 tablet, R-6Normal     Return in about 4 weeks (around 1/23/2025) for adipex.       SUBJECTIVE/OBJECTIVE   Dafne Wu is a 49 y.o. female who presents for follow up on her weight loss with the use of Adipex.     Weight Loss  Patient presents today for follow-up on weight loss utilizing Adipex.  Patient has had a weight loss.  Patient denies any swelling, anxiousness, palpitations, sweating.  Patient side effects do include dry mouth and constipation. She has started using Mirilax.  Starting weight was 261, current weight is 249 lb. Goal for next office visit is 245 lb.  She continue to track and make smart choices. She states was a little difficult during the holidays  She states she is trying to get at least three times a week, but has definitely been doing twice a week.  Reports she recently got a tracker for her steps initial goal will be 10,000 steps a day.  Overall goal is 180 lb.     Cold Sore Right Nare. She states she gets them randomly on her nose, and her upper lip. Most recent started yesterday.  Patient states in the past she has taken acyclovir which helps her cold sores to alleviate much sooner.    She does have to schedule her titrate study for her sleep apnea machine. She states she will call later this week.       Patient Care Team:  Tigre Orozco APRN -

## 2025-01-22 ENCOUNTER — OFFICE VISIT (OUTPATIENT)
Dept: FAMILY MEDICINE CLINIC | Age: 50
End: 2025-01-22

## 2025-01-22 VITALS
DIASTOLIC BLOOD PRESSURE: 80 MMHG | WEIGHT: 249 LBS | SYSTOLIC BLOOD PRESSURE: 116 MMHG | OXYGEN SATURATION: 98 % | BODY MASS INDEX: 39.08 KG/M2 | TEMPERATURE: 97.8 F | HEIGHT: 67 IN | HEART RATE: 70 BPM

## 2025-01-22 DIAGNOSIS — Z13.0 SCREENING FOR DEFICIENCY ANEMIA: ICD-10-CM

## 2025-01-22 DIAGNOSIS — Z13.29 SCREENING FOR THYROID DISORDER: ICD-10-CM

## 2025-01-22 DIAGNOSIS — Z13.220 LIPID SCREENING: ICD-10-CM

## 2025-01-22 DIAGNOSIS — E66.813 CLASS 3 SEVERE OBESITY WITHOUT SERIOUS COMORBIDITY WITH BODY MASS INDEX (BMI) OF 40.0 TO 44.9 IN ADULT, UNSPECIFIED OBESITY TYPE: Primary | ICD-10-CM

## 2025-01-22 DIAGNOSIS — E66.01 CLASS 3 SEVERE OBESITY WITHOUT SERIOUS COMORBIDITY WITH BODY MASS INDEX (BMI) OF 40.0 TO 44.9 IN ADULT, UNSPECIFIED OBESITY TYPE: Primary | ICD-10-CM

## 2025-01-22 DIAGNOSIS — G47.33 OBSTRUCTIVE SLEEP APNEA: ICD-10-CM

## 2025-01-22 DIAGNOSIS — Z13.1 SCREENING FOR DIABETES MELLITUS: ICD-10-CM

## 2025-01-22 DIAGNOSIS — E55.9 VITAMIN D DEFICIENCY: ICD-10-CM

## 2025-01-22 RX ORDER — PHENTERMINE HYDROCHLORIDE 37.5 MG/1
37.5 TABLET ORAL
Qty: 30 TABLET | Refills: 0 | Status: CANCELLED | OUTPATIENT
Start: 2025-01-22 | End: 2025-02-21

## 2025-01-22 SDOH — ECONOMIC STABILITY: FOOD INSECURITY: WITHIN THE PAST 12 MONTHS, YOU WORRIED THAT YOUR FOOD WOULD RUN OUT BEFORE YOU GOT MONEY TO BUY MORE.: NEVER TRUE

## 2025-01-22 SDOH — ECONOMIC STABILITY: FOOD INSECURITY: WITHIN THE PAST 12 MONTHS, THE FOOD YOU BOUGHT JUST DIDN'T LAST AND YOU DIDN'T HAVE MONEY TO GET MORE.: NEVER TRUE

## 2025-01-22 ASSESSMENT — PATIENT HEALTH QUESTIONNAIRE - PHQ9
1. LITTLE INTEREST OR PLEASURE IN DOING THINGS: NOT AT ALL
2. FEELING DOWN, DEPRESSED OR HOPELESS: NOT AT ALL
SUM OF ALL RESPONSES TO PHQ QUESTIONS 1-9: 0
SUM OF ALL RESPONSES TO PHQ9 QUESTIONS 1 & 2: 0

## 2025-01-22 ASSESSMENT — ENCOUNTER SYMPTOMS
SHORTNESS OF BREATH: 0
CONSTIPATION: 1

## 2025-01-22 NOTE — PROGRESS NOTES
[facility]  [facility] PRIMARY [facility] [address] [address]  Dept: [phone]    CHIEF COMPLAINT:      Chief Complaint   Patient presents with    Obesity     4 week f/u         ASSESSMENT & PLAN     1. Obstructive sleep apnea  -     DME Order for CPAP as OP  2. Class 3 severe obesity without serious comorbidity with body mass index (BMI) of 40.0 to 44.9 in adult, unspecified obesity type  3. Vitamin D deficiency  -     Vitamin D 25 Hydroxy; Future  4. Lipid screening  -     Lipid Panel; Future  5. Screening for diabetes mellitus  -     Comprehensive Metabolic Panel, Fasting; Future  -     Hemoglobin A1C; Future  6. Screening for thyroid disorder  -     TSH; Future  -     T4, Free; Future  7. Screening for deficiency anemia  -     CBC with Auto Differential; Future     List ordered for the labs for annual physical  Encourage healthy diet: high protein, low carb, healthy fats  Encourage good hydration   Encourage exercise 3-4 days weekly  Try to keep calories between 1500 and 1700 a day  Try to get in 15-20,000 steps a day  Continue to take medications as prescribed  Do not stop or start medications without first discussing with your providers.    Return in about 2 months (around 3/22/2025) for annual physical and talk about adipex.       SUBJECTIVE/OBJECTIVE   Dafne Wu is a 49 y.o. female who presents for follow up on her weight loss.     Weight Loss  Patient presents today for follow-up on weight loss utilizing Adipex.  Patient has had a weight loss.  Patient denies any swelling, anxiousness, palpitations, sweating.  Patient side effects do include dry mouth and constipation. She has started using Mirilax.  Starting weight was 261, current weight is 249 lb. Her weight has not moved. She did not gain, but she also did not lose. She states she has noticed an increase in her cravings.   She continue to track and make smart choices. She states was a little difficult during

## 2025-03-19 DIAGNOSIS — F41.9 ANXIETY: ICD-10-CM

## 2025-03-19 RX ORDER — PAROXETINE 20 MG/1
20 TABLET, FILM COATED ORAL DAILY
Qty: 30 TABLET | Refills: 5 | Status: SHIPPED | OUTPATIENT
Start: 2025-03-19

## 2025-03-19 NOTE — TELEPHONE ENCOUNTER
LOV: 1/22/2025    RTO: No future appointments.  LRF: 10/30/2024          Controlled Substance Monitoring:    Acute and Chronic Pain Monitoring:   RX Monitoring Attestation Periodic Controlled Substance Monitoring   2/22/2019   9:16 AM The Prescription Monitoring Report for this patient was reviewed today. No signs of potential drug abuse or diversion identified.;Possible medication side effects, risk of tolerance/dependence & alternative treatments discussed.

## 2025-05-13 ENCOUNTER — TELEPHONE (OUTPATIENT)
Dept: FAMILY MEDICINE CLINIC | Age: 50
End: 2025-05-13

## 2025-05-13 NOTE — TELEPHONE ENCOUNTER
Laya from [facility] called and stated that patient needs a current face to face visit/chart note to prove she is compliant or she is going to have to return her sleep equipment.     Called patient to schedule acute visit with RC sooner than the follow up she has scheduled in June. Patient did not answer. Office number left and patient encouraged to reach out when available. Will send patient a [employer] message as well.

## 2025-07-08 ENCOUNTER — OFFICE VISIT (OUTPATIENT)
Dept: FAMILY MEDICINE CLINIC | Age: 50
End: 2025-07-08
Payer: COMMERCIAL

## 2025-07-08 VITALS
HEART RATE: 72 BPM | DIASTOLIC BLOOD PRESSURE: 68 MMHG | HEIGHT: 68 IN | SYSTOLIC BLOOD PRESSURE: 102 MMHG | WEIGHT: 252 LBS | OXYGEN SATURATION: 97 % | BODY MASS INDEX: 38.19 KG/M2 | TEMPERATURE: 98.1 F

## 2025-07-08 DIAGNOSIS — E66.813 CLASS 3 SEVERE OBESITY WITHOUT SERIOUS COMORBIDITY WITH BODY MASS INDEX (BMI) OF 40.0 TO 44.9 IN ADULT, UNSPECIFIED OBESITY TYPE (HCC): Primary | ICD-10-CM

## 2025-07-08 DIAGNOSIS — G47.33 OBSTRUCTIVE SLEEP APNEA: ICD-10-CM

## 2025-07-08 DIAGNOSIS — Z12.31 ENCOUNTER FOR SCREENING MAMMOGRAM FOR BREAST CANCER: ICD-10-CM

## 2025-07-08 PROCEDURE — G8417 CALC BMI ABV UP PARAM F/U: HCPCS | Performed by: REGISTERED NURSE

## 2025-07-08 PROCEDURE — 1036F TOBACCO NON-USER: CPT | Performed by: REGISTERED NURSE

## 2025-07-08 PROCEDURE — 99214 OFFICE O/P EST MOD 30 MIN: CPT | Performed by: REGISTERED NURSE

## 2025-07-08 PROCEDURE — G8427 DOCREV CUR MEDS BY ELIG CLIN: HCPCS | Performed by: REGISTERED NURSE

## 2025-07-08 RX ORDER — PHENTERMINE HYDROCHLORIDE 37.5 MG/1
37.5 TABLET ORAL
Qty: 30 TABLET | Refills: 0 | Status: SHIPPED | OUTPATIENT
Start: 2025-07-08 | End: 2025-08-07

## 2025-07-08 ASSESSMENT — ENCOUNTER SYMPTOMS
SHORTNESS OF BREATH: 0
CONSTIPATION: 0

## 2025-07-08 ASSESSMENT — PATIENT HEALTH QUESTIONNAIRE - PHQ9
2. FEELING DOWN, DEPRESSED OR HOPELESS: NOT AT ALL
SUM OF ALL RESPONSES TO PHQ QUESTIONS 1-9: 0
1. LITTLE INTEREST OR PLEASURE IN DOING THINGS: NOT AT ALL
SUM OF ALL RESPONSES TO PHQ QUESTIONS 1-9: 0

## 2025-07-08 NOTE — PROGRESS NOTES
Cholecalciferol (VITAMIN D-3 PO) Take by mouth      levonorgestrel (LILETTA, 52 MG,) 20.1 MCG/DAY IUD IUD 52 mg 1 each by IntraUTERine route once       No current facility-administered medications for this visit.       ALLERGIES:      Allergies   Allergen Reactions    Penicillins      Pt has been told since childhood she had an allergy       RESULTS:      No results found for this visit on 07/08/25.   No visits with results within 6 Month(s) from this visit.   Latest known visit with results is:   Hospital Outpatient Visit on 11/06/2024   Component Date Value Ref Range Status    Status 11/06/2024 Interpreted   Final        \"Life is what you make it... Be kind to others but most important be kind to yourself\"      Please note that this chart was generated using voice recognition Dragon dictation software.  Although every effort was made to ensure the accuracy of this automated transcription, some errors in transcription may have occurred.  The patient (or guardian, if applicable) and other individuals in attendance with the patient were advised that Artificial Intelligence will be utilized during this visit to record, process the conversation to generate a clinical note, and support improvement of the AI technology. The patient (or guardian, if applicable) and other individuals in attendance at the appointment consented to the use of AI, including the recording.      Electronically signed by REBECCA Talavera CNP on 7/8/2025 at 5:26 PM

## 2025-07-11 ENCOUNTER — HOSPITAL ENCOUNTER (OUTPATIENT)
Age: 50
Setting detail: SPECIMEN
Discharge: HOME | End: 2025-07-11

## 2025-07-11 DIAGNOSIS — Z13.220 LIPID SCREENING: ICD-10-CM

## 2025-07-11 DIAGNOSIS — Z13.0 SCREENING FOR DEFICIENCY ANEMIA: ICD-10-CM

## 2025-07-11 DIAGNOSIS — Z13.29 SCREENING FOR THYROID DISORDER: ICD-10-CM

## 2025-07-11 DIAGNOSIS — E55.9 VITAMIN D DEFICIENCY: ICD-10-CM

## 2025-07-11 DIAGNOSIS — Z13.1 SCREENING FOR DIABETES MELLITUS: ICD-10-CM

## 2025-07-11 LAB
25(OH)D3 SERPL-MCNC: 29.2 NG/ML (ref 30–100)
ALBUMIN SERPL-MCNC: 4.1 G/DL (ref 3.5–5.2)
ALBUMIN/GLOB SERPL: 1.5 {RATIO} (ref 1–2.5)
ALP SERPL-CCNC: 49 U/L (ref 35–104)
ALT SERPL-CCNC: 17 U/L (ref 10–35)
ANION GAP SERPL CALCULATED.3IONS-SCNC: 13 MMOL/L (ref 9–16)
AST SERPL-CCNC: 23 U/L (ref 10–35)
BASOPHILS # BLD: 0.07 K/UL (ref 0–0.2)
BASOPHILS NFR BLD: 1 % (ref 0–2)
BILIRUB SERPL-MCNC: 0.5 MG/DL (ref 0–1.2)
BUN SERPL-MCNC: 13 MG/DL (ref 6–20)
CALCIUM SERPL-MCNC: 8.8 MG/DL (ref 8.6–10.4)
CHLORIDE SERPL-SCNC: 102 MMOL/L (ref 98–107)
CHOLEST SERPL-MCNC: 174 MG/DL (ref 0–199)
CHOLESTEROL/HDL RATIO: 2.6
CO2 SERPL-SCNC: 23 MMOL/L (ref 20–31)
CREAT SERPL-MCNC: 0.7 MG/DL (ref 0.6–0.9)
EOSINOPHIL # BLD: 0.08 K/UL (ref 0–0.44)
EOSINOPHILS RELATIVE PERCENT: 2 % (ref 1–4)
ERYTHROCYTE [DISTWIDTH] IN BLOOD BY AUTOMATED COUNT: 13.2 % (ref 11.8–14.4)
EST. AVERAGE GLUCOSE BLD GHB EST-MCNC: 97 MG/DL
GFR, ESTIMATED: >90 ML/MIN/1.73M2
GLUCOSE P FAST SERPL-MCNC: 82 MG/DL (ref 74–99)
HBA1C MFR BLD: 5 % (ref 4–6)
HCT VFR BLD AUTO: 38.1 % (ref 36.3–47.1)
HDLC SERPL-MCNC: 67 MG/DL
HGB BLD-MCNC: 12.2 G/DL (ref 11.9–15.1)
IMM GRANULOCYTES # BLD AUTO: <0.03 K/UL (ref 0–0.3)
IMM GRANULOCYTES NFR BLD: 0 %
LDLC SERPL CALC-MCNC: 98 MG/DL (ref 0–100)
LYMPHOCYTES NFR BLD: 1.79 K/UL (ref 1.1–3.7)
LYMPHOCYTES RELATIVE PERCENT: 34 % (ref 24–43)
MCH RBC QN AUTO: 29.8 PG (ref 25.2–33.5)
MCHC RBC AUTO-ENTMCNC: 32 G/DL (ref 28.4–34.8)
MCV RBC AUTO: 92.9 FL (ref 82.6–102.9)
MONOCYTES NFR BLD: 0.38 K/UL (ref 0.1–1.2)
MONOCYTES NFR BLD: 7 % (ref 3–12)
NEUTROPHILS NFR BLD: 56 % (ref 36–65)
NEUTS SEG NFR BLD: 2.99 K/UL (ref 1.5–8.1)
NRBC BLD-RTO: 0 PER 100 WBC
PLATELET # BLD AUTO: 349 K/UL (ref 138–453)
PMV BLD AUTO: 10.3 FL (ref 8.1–13.5)
POTASSIUM SERPL-SCNC: 4.2 MMOL/L (ref 3.7–5.3)
PROT SERPL-MCNC: 6.8 G/DL (ref 6.6–8.7)
RBC # BLD AUTO: 4.1 M/UL (ref 3.95–5.11)
SODIUM SERPL-SCNC: 138 MMOL/L (ref 136–145)
T4 FREE SERPL-MCNC: 1 NG/DL (ref 0.92–1.68)
TRIGL SERPL-MCNC: 47 MG/DL
TSH SERPL DL<=0.05 MIU/L-ACNC: 1.13 UIU/ML (ref 0.27–4.2)
VLDLC SERPL CALC-MCNC: 9 MG/DL (ref 1–30)
WBC OTHER # BLD: 5.3 K/UL (ref 3.5–11.3)

## 2025-08-06 ENCOUNTER — OFFICE VISIT (OUTPATIENT)
Dept: ORTHOPEDIC SURGERY | Age: 50
End: 2025-08-06
Payer: COMMERCIAL

## 2025-08-06 VITALS — WEIGHT: 252 LBS | BODY MASS INDEX: 38.19 KG/M2 | HEIGHT: 68 IN

## 2025-08-06 DIAGNOSIS — R52 PAIN: Primary | ICD-10-CM

## 2025-08-06 PROCEDURE — 99204 OFFICE O/P NEW MOD 45 MIN: CPT | Performed by: PHYSICIAN ASSISTANT

## 2025-08-06 PROCEDURE — 1036F TOBACCO NON-USER: CPT | Performed by: PHYSICIAN ASSISTANT

## 2025-08-06 PROCEDURE — G8427 DOCREV CUR MEDS BY ELIG CLIN: HCPCS | Performed by: PHYSICIAN ASSISTANT

## 2025-08-06 PROCEDURE — G8417 CALC BMI ABV UP PARAM F/U: HCPCS | Performed by: PHYSICIAN ASSISTANT

## 2025-08-06 RX ORDER — MELOXICAM 15 MG/1
15 TABLET ORAL DAILY
Qty: 30 TABLET | Refills: 1 | Status: SHIPPED | OUTPATIENT
Start: 2025-08-06

## 2025-08-13 ENCOUNTER — HOSPITAL ENCOUNTER (OUTPATIENT)
Age: 50
Discharge: HOME | End: 2025-08-15
Payer: COMMERCIAL

## 2025-08-13 DIAGNOSIS — Z12.31 ENCOUNTER FOR SCREENING MAMMOGRAM FOR BREAST CANCER: ICD-10-CM

## 2025-08-13 PROCEDURE — 77063 BREAST TOMOSYNTHESIS BI: CPT

## 2025-08-20 ENCOUNTER — OFFICE VISIT (OUTPATIENT)
Dept: FAMILY MEDICINE CLINIC | Age: 50
End: 2025-08-20
Payer: COMMERCIAL

## 2025-08-20 VITALS
HEIGHT: 68 IN | SYSTOLIC BLOOD PRESSURE: 128 MMHG | WEIGHT: 247 LBS | TEMPERATURE: 97.5 F | OXYGEN SATURATION: 99 % | BODY MASS INDEX: 37.44 KG/M2 | DIASTOLIC BLOOD PRESSURE: 76 MMHG | HEART RATE: 63 BPM

## 2025-08-20 DIAGNOSIS — R53.83 OTHER FATIGUE: ICD-10-CM

## 2025-08-20 DIAGNOSIS — E66.813 CLASS 3 SEVERE OBESITY WITHOUT SERIOUS COMORBIDITY WITH BODY MASS INDEX (BMI) OF 40.0 TO 44.9 IN ADULT, UNSPECIFIED OBESITY TYPE (HCC): Primary | ICD-10-CM

## 2025-08-20 PROCEDURE — 99214 OFFICE O/P EST MOD 30 MIN: CPT | Performed by: REGISTERED NURSE

## 2025-08-20 PROCEDURE — G8417 CALC BMI ABV UP PARAM F/U: HCPCS | Performed by: REGISTERED NURSE

## 2025-08-20 PROCEDURE — G8427 DOCREV CUR MEDS BY ELIG CLIN: HCPCS | Performed by: REGISTERED NURSE

## 2025-08-20 PROCEDURE — 1036F TOBACCO NON-USER: CPT | Performed by: REGISTERED NURSE

## 2025-08-20 RX ORDER — PHENTERMINE HYDROCHLORIDE 37.5 MG/1
37.5 TABLET ORAL
Qty: 30 TABLET | Refills: 0 | Status: SHIPPED | OUTPATIENT
Start: 2025-08-20 | End: 2025-09-19

## 2025-08-20 ASSESSMENT — ENCOUNTER SYMPTOMS
ABDOMINAL PAIN: 0
SORE THROAT: 0
VOMITING: 0
NAUSEA: 0
COUGH: 0

## 2025-09-02 ENCOUNTER — HOSPITAL ENCOUNTER (OUTPATIENT)
Age: 50
Setting detail: SPECIMEN
Discharge: HOME | End: 2025-09-02

## 2025-09-02 DIAGNOSIS — R53.83 OTHER FATIGUE: ICD-10-CM

## 2025-09-02 LAB
FSH SERPL-ACNC: 20.7 MIU/ML
LH SERPL-ACNC: 30.3 MIU/ML (ref 1.7–8.6)
PROLACTIN SERPL-MCNC: 29.4 NG/ML (ref 4.79–23.3)

## 2025-09-05 LAB
ESTRADIOL LEVEL: 210.1 PG/ML
ESTROGEN TOTAL: 294.9 PG/ML
ESTRONE SERPL-MCNC: 84.8 PG/ML

## (undated) DEVICE — SPONGE LAP W18XL18IN WHT COT 4 PLY FLD STRUNG RADPQ DISP ST

## (undated) DEVICE — DRAPE,REIN 53X77,STERILE: Brand: MEDLINE

## (undated) DEVICE — SUTURE MCRYL SZ 3 0 L18IN ABSRB UD PS 2 3 8 CIR REV CUT NDL MCP497G

## (undated) DEVICE — DRAPE,T,LAPARO,TRANS,STERILE: Brand: MEDLINE

## (undated) DEVICE — 3M™ STERI-STRIP™ REINFORCED ADHESIVE SKIN CLOSURES, R1547, 1/2 IN X 4 IN (12 MM X 100 MM), 6 STRIPS/ENVELOPE: Brand: 3M™ STERI-STRIP™

## (undated) DEVICE — Device

## (undated) DEVICE — SOLUTION IV IRRIG 500ML 0.9% SODIUM CHL 2F7123

## (undated) DEVICE — Z DISCONTINUED BY MEDLINE USE 2711682 TRAY SKIN PREP DRY W/ PREM GLV

## (undated) DEVICE — 1840 FOAM BLOCK NEEDLE COUNTER: Brand: DEVON

## (undated) DEVICE — GOWN,AURORA,NONREINFORCED,LARGE: Brand: MEDLINE

## (undated) DEVICE — DISCONTINUED USE 393278 SYRINGE 10 ML HYPO W/O NDL LL TP PLSTC ST

## (undated) DEVICE — Z DISCONTINUED USE 2558682 BANDAGE COMPR XL MAMM COMFORTABLE HIGHLY ABSRB POST SURG

## (undated) DEVICE — NEEDLE SPNL 20GA L3.5IN YEL HUB S STL REG WALL FIT STYL W/

## (undated) DEVICE — 60 ML SYRINGE LUER-LOCK TIP: Brand: MONOJECT

## (undated) DEVICE — STERILE LATEX POWDER-FREE SURGICAL GLOVESWITH NITRILE COATING: Brand: PROTEXIS

## (undated) DEVICE — SYSTEM IMPL DEL FOR BRST IMPL FUN (SEE COMMENT)

## (undated) DEVICE — GAUZE,SPONGE,4"X4",16PLY,STRL,LF,10/TRAY: Brand: MEDLINE

## (undated) DEVICE — TOWEL,OR,DSP,ST,BLUE,STD,6/PK,12PK/CS: Brand: MEDLINE

## (undated) DEVICE — STERILE POLYISOPRENE POWDER-FREE SURGICAL GLOVES WITH EMOLLIENT COATING: Brand: PROTEXIS

## (undated) DEVICE — DECANTER FLD 9IN ST BG FOR ASEP TRNSF OF FLD